# Patient Record
Sex: FEMALE | Race: WHITE
[De-identification: names, ages, dates, MRNs, and addresses within clinical notes are randomized per-mention and may not be internally consistent; named-entity substitution may affect disease eponyms.]

---

## 2019-10-21 ENCOUNTER — RX ONLY (RX ONLY)
Age: 39
End: 2019-10-21

## 2019-10-21 ENCOUNTER — OPTICAL OFFICE (OUTPATIENT)
Dept: URBAN - NONMETROPOLITAN AREA CLINIC 5 | Facility: CLINIC | Age: 39
Setting detail: OPHTHALMOLOGY
End: 2019-10-21
Payer: COMMERCIAL

## 2019-10-21 ENCOUNTER — DOCTOR'S OFFICE (OUTPATIENT)
Dept: URBAN - NONMETROPOLITAN AREA CLINIC 2 | Facility: CLINIC | Age: 39
Setting detail: OPHTHALMOLOGY
End: 2019-10-21
Payer: COMMERCIAL

## 2019-10-21 DIAGNOSIS — H52.223: ICD-10-CM

## 2019-10-21 DIAGNOSIS — H52.13: ICD-10-CM

## 2019-10-21 PROCEDURE — V2750 ANTI-REFLECTIVE COATING: HCPCS | Performed by: OPTOMETRIST

## 2019-10-21 PROCEDURE — V2020 VISION SVCS FRAMES PURCHASES: HCPCS | Performed by: OPTOMETRIST

## 2019-10-21 PROCEDURE — 92004 COMPRE OPH EXAM NEW PT 1/>: CPT | Performed by: OPTOMETRIST

## 2019-10-21 PROCEDURE — V2104 SPHEROCYLINDR 4.00D/2.12-4D: HCPCS | Performed by: OPTOMETRIST

## 2019-10-21 ASSESSMENT — REFRACTION_MANIFEST
OS_AXIS: 165
OD_VA1: 20/
OD_VA3: 20/
OS_VA1: 20/20
OS_VA2: 20/
OD_VA2: 20/
OS_SPHERE: -1.75
OD_VA2: 20/
OS_VA1: 20/
OD_VA3: 20/
OU_VA: 20/
OS_VA2: 20/
OS_CYLINDER: -3.25
OS_VA3: 20/
OD_AXIS: 010
OD_VA1: 20/20
OD_CYLINDER: -3.00
OU_VA: 20/20
OS_VA3: 20/
OD_SPHERE: -1.50

## 2019-10-21 ASSESSMENT — AXIALLENGTH_DERIVED
OD_AL: 23.1176
OD_AL: 23.0244

## 2019-10-21 ASSESSMENT — KERATOMETRY
OD_AXISANGLE_DEGREES: 169
OD_K2POWER_DIOPTERS: 49.75
OD_K1POWER_DIOPTERS: 46.25

## 2019-10-21 ASSESSMENT — REFRACTION_CURRENTRX
OS_OVR_VA: 20/
OD_AXIS: 009
OD_VPRISM_DIRECTION: SV
OS_CYLINDER: -3.00
OD_OVR_VA: 20/
OD_OVR_VA: 20/
OS_SPHERE: -1.75
OD_OVR_VA: 20/
OD_CYLINDER: -2.75
OS_VPRISM_DIRECTION: SV
OS_OVR_VA: 20/
OS_AXIS: 163
OD_SPHERE: -1.75
OS_OVR_VA: 20/

## 2019-10-21 ASSESSMENT — CONFRONTATIONAL VISUAL FIELD TEST (CVF)
OS_FINDINGS: FULL
OD_FINDINGS: FULL

## 2019-10-21 ASSESSMENT — REFRACTION_AUTOREFRACTION
OS_SPHERE: -1.25
OD_CYLINDER: -3.50
OS_AXIS: 161
OD_SPHERE: -1.00
OS_CYLINDER: -3.75
OD_AXIS: 005

## 2019-10-21 ASSESSMENT — SPHEQUIV_DERIVED
OD_SPHEQUIV: -2.75
OS_SPHEQUIV: -3.375
OS_SPHEQUIV: -3.125
OD_SPHEQUIV: -3

## 2019-10-21 ASSESSMENT — VISUAL ACUITY
OD_BCVA: 20/20
OS_BCVA: 20/20-1

## 2021-05-17 ENCOUNTER — APPOINTMENT (OUTPATIENT)
Dept: PREADMISSION TESTING | Facility: HOSPITAL | Age: 41
End: 2021-05-17
Attending: ORTHOPAEDIC SURGERY
Payer: OTHER MISCELLANEOUS

## 2021-05-17 ENCOUNTER — HOSPITAL ENCOUNTER (OUTPATIENT)
Dept: CARDIOLOGY | Facility: HOSPITAL | Age: 41
Discharge: HOME | End: 2021-05-17
Attending: ORTHOPAEDIC SURGERY
Payer: OTHER MISCELLANEOUS

## 2021-05-17 ENCOUNTER — TRANSCRIBE ORDERS (OUTPATIENT)
Dept: PREADMISSION TESTING | Facility: HOSPITAL | Age: 41
End: 2021-05-17

## 2021-05-17 VITALS
OXYGEN SATURATION: 97 % | DIASTOLIC BLOOD PRESSURE: 58 MMHG | HEIGHT: 67 IN | HEART RATE: 72 BPM | WEIGHT: 145 LBS | RESPIRATION RATE: 20 BRPM | BODY MASS INDEX: 22.76 KG/M2 | TEMPERATURE: 97.9 F | SYSTOLIC BLOOD PRESSURE: 90 MMHG

## 2021-05-17 DIAGNOSIS — Z01.818 ENCOUNTER FOR OTHER PREPROCEDURAL EXAMINATION: ICD-10-CM

## 2021-05-17 DIAGNOSIS — Q76.2 CONGENITAL SPONDYLOLISTHESIS: ICD-10-CM

## 2021-05-17 DIAGNOSIS — Z11.59 ENCOUNTER FOR SCREENING FOR OTHER VIRAL DISEASES: Primary | ICD-10-CM

## 2021-05-17 DIAGNOSIS — Z11.59 ENCOUNTER FOR SCREENING FOR OTHER VIRAL DISEASES: ICD-10-CM

## 2021-05-17 DIAGNOSIS — Z01.818 ENCOUNTER FOR OTHER PREPROCEDURAL EXAMINATION: Primary | ICD-10-CM

## 2021-05-17 PROBLEM — F41.9 ANXIETY: Status: ACTIVE | Noted: 2021-05-17

## 2021-05-17 PROBLEM — I95.9 LOW BLOOD PRESSURE: Status: ACTIVE | Noted: 2021-05-17

## 2021-05-17 LAB
ABO + RH BLD: NORMAL
ANION GAP SERPL CALC-SCNC: 7 MEQ/L (ref 3–15)
ATRIAL RATE: 66
BLD GP AB SCN SERPL QL: NEGATIVE
BLOOD BANK CMNT PATIENT-IMP: NORMAL
BUN SERPL-MCNC: 13 MG/DL (ref 8–20)
CALCIUM SERPL-MCNC: 9.4 MG/DL (ref 8.9–10.3)
CHLORIDE SERPL-SCNC: 104 MEQ/L (ref 98–109)
CO2 SERPL-SCNC: 26 MEQ/L (ref 22–32)
CREAT SERPL-MCNC: 0.6 MG/DL (ref 0.6–1.1)
D AG BLD QL: POSITIVE
ERYTHROCYTE [DISTWIDTH] IN BLOOD BY AUTOMATED COUNT: 12.2 % (ref 11.7–14.4)
GFR SERPL CREATININE-BSD FRML MDRD: >60 ML/MIN/1.73M*2
GLUCOSE SERPL-MCNC: 89 MG/DL (ref 70–99)
HCT VFR BLDCO AUTO: 42.8 % (ref 35–45)
HGB BLD-MCNC: 14.1 G/DL (ref 11.8–15.7)
LABORATORY COMMENT REPORT: NORMAL
MCH RBC QN AUTO: 31.9 PG (ref 28–33.2)
MCHC RBC AUTO-ENTMCNC: 32.9 G/DL (ref 32.2–35.5)
MCV RBC AUTO: 96.8 FL (ref 83–98)
P AXIS: 55
PDW BLD AUTO: 9.9 FL (ref 9.4–12.3)
PLATELET # BLD AUTO: 200 K/UL (ref 150–369)
POTASSIUM SERPL-SCNC: 4.4 MEQ/L (ref 3.6–5.1)
PR INTERVAL: 136
QRS DURATION: 88
QT INTERVAL: 388
QTC CALCULATION(BAZETT): 406
R AXIS: 76
RBC # BLD AUTO: 4.42 M/UL (ref 3.93–5.22)
SARS-COV-2 RNA RESP QL NAA+PROBE: NEGATIVE
SODIUM SERPL-SCNC: 137 MEQ/L (ref 136–144)
SPECIMEN EXP DATE BLD: NORMAL
T WAVE AXIS: 55
VENTRICULAR RATE: 66
WBC # BLD AUTO: 7.35 K/UL (ref 3.8–10.5)

## 2021-05-17 PROCEDURE — 85027 COMPLETE CBC AUTOMATED: CPT

## 2021-05-17 PROCEDURE — 80048 BASIC METABOLIC PNL TOTAL CA: CPT

## 2021-05-17 PROCEDURE — U0003 INFECTIOUS AGENT DETECTION BY NUCLEIC ACID (DNA OR RNA); SEVERE ACUTE RESPIRATORY SYNDROME CORONAVIRUS 2 (SARS-COV-2) (CORONAVIRUS DISEASE [COVID-19]), AMPLIFIED PROBE TECHNIQUE, MAKING USE OF HIGH THROUGHPUT TECHNOLOGIES AS DESCRIBED BY CMS-2020-01-R: HCPCS

## 2021-05-17 PROCEDURE — 86850 RBC ANTIBODY SCREEN: CPT

## 2021-05-17 PROCEDURE — 93005 ELECTROCARDIOGRAM TRACING: CPT

## 2021-05-17 PROCEDURE — 36415 COLL VENOUS BLD VENIPUNCTURE: CPT

## 2021-05-17 PROCEDURE — 99203 OFFICE O/P NEW LOW 30 MIN: CPT | Performed by: HOSPITALIST

## 2021-05-17 RX ORDER — CLONAZEPAM 0.5 MG/1
0.5 TABLET ORAL
COMMUNITY
Start: 2021-03-18 | End: 2021-05-17 | Stop reason: ENTERED-IN-ERROR

## 2021-05-17 RX ORDER — CLONAZEPAM 0.5 MG/1
TABLET ORAL AS NEEDED
COMMUNITY
Start: 2021-03-18

## 2021-05-17 RX ORDER — SERTRALINE HYDROCHLORIDE 50 MG/1
50 TABLET, FILM COATED ORAL
COMMUNITY
Start: 2021-04-20

## 2021-05-17 RX ORDER — NAPROXEN 500 MG/1
500 TABLET ORAL
COMMUNITY
End: 2021-05-21 | Stop reason: HOSPADM

## 2021-05-17 ASSESSMENT — PAIN SCALES - GENERAL: PAINLEVEL: 7

## 2021-05-17 NOTE — ASSESSMENT & PLAN NOTE
Good exercise capacity able to reach greater than 4 mets activity levels.  No history of CAD, no CHF, no arrhythmias, no TIA/CVA.  ECG benign.  Preop labs within acceptable limits.    P:  Can proceed to surgery without additional testing at an acceptable risk for this intermediate risk surgery  NSAID use preop per orthopedic recommendations

## 2021-05-17 NOTE — PRE-PROCEDURE INSTRUCTIONS
1. Admissions will call you with your arrival time on  5/19 (day prior to surgery) between 2pm - 4pm. For questions about your arrival time, please call [phone].    2. Please report to the [facility] in the [place] on the day of your procedure. Enter the hospital through the [place] [facility] (main entrance at [address], [place]). If you are parking in the [facility], come to the ground floor of the garage and follow signs to the [facility]. Bring your insurance card and photo ID.     3. Please follow these fasting guidelines:  - STOP all solid food 8 hours prior to arrival.   - No more than 12oz of water is permitted and must STOP 2 HOURS prior to arrival to the hospital.     4. Early on the morning of the procedure, please take the following medications listed below with a sip of water, in addition to any medications prescribed by your surgeon:   Sertraline (Zoloft)    *NO aspirin, ibuprofen, anti-inflammatories, fish oil or Vitamin E unless ordered by physician.    *Stop taking Naproxen-stopped in March 2021     5. Other instructions: antibacterial shower x 2 days prior to surgery 5/18, 5/19 and the morning of surgery 5/20 , brush teeth    6. If you develop a cough, cold, fever, or other symptom prior to the date of the procedure, please report it to your physician immediately.    7. If you need to cancel the procedure for any reason, please contact your physician.    8. Make arrangements to have someone drive you home from the procedure. If you have not arranged for transportation home, your surgery may be cancelled.     9. You may not take public transportation or a cab unless accompanied by a responsible person.    10. You may not drive a car or operate complex or potentially dangerous machinery for 24 hours following anesthesia and/or sedation.    11. If it is medically necessary for you to have a longer stay, you will be informed as soon as the decision is  made.    12. Do not wear or bring anything of value to the hospital, including medication or jewelry of any kind. Do not wear make-up or contact lenses. Do bring your glasses and hearing aid, with a case. If you use a CPAP machine and will be overnight, please bring it with you. If you use any inhalers, please bring them as well.     13. Dress in comfortable clothes.    14. If instructed, please bring a copy of your Advance Directive (Living Will/Durable Power of ) on the day of your procedure.    15. Ensuring your safety at all times is a very important part of our [facility] Culture of Safety. After having surgery and sedation, you are at risk for falling and balance issues. Although you may feel awake, the effects of the medication can last up to 24 hours after anesthesia. If you need to use the bathroom during your recovery period, nursing staff will escort you there and stay with you to ensure your safety.    Pre operative instructions given as per protocol.  Form explained by:     Lauryn Zambrano RN

## 2021-05-17 NOTE — CONSULTS
[facility] Medicine Service -  Pre-Operative Consultation       Patient Name: Sharifa Sal  Referring Surgeon: Dr. Skip Golden    Reason for Referral: Pre-Operative Evaluation  Surgical Procedure: Posterior L5-S1 Decompression, Instrumented Fusion, Autograft, Allograft  Operative Date: 2021  Other Providers:      PCP: Unable, To Obtain Pcp          HISTORY OF PRESENT ILLNESS      Sharifa Sal is a 40 y.o. female presenting today to the [facility] at [facility] for pre-operative evaluation prior to planned surgery.    Reports having significant low back pain for about a year since a work injury.  No relief with conservative measures such as physical therapy, pain control/back injections.  Her back pain interfering with her mobility, day-to-day activities so opting for surgery    In regards to medical history:      The patient denies any current or recent chest pain or pressure, dyspnea, cough, sputum, fevers, chills, abdominal pain, nausea, vomiting, diarrhea or other symptoms. No urinary symptoms, no BRBPR or melena    Functionally, the patient is able to ascend a flight or so of stairs with no dyspnea or chest pain.     The patient denies, on specific questioning, the following:  No history of MI, arrhythmia,or CHF.  No history of ROXANA.  No history of DVT/PE.  No history of COPD.  No history of CVA.  No history of DM.   No history of CKD.     PAST MEDICAL AND SURGICAL HISTORY      Past Medical History:   Diagnosis Date   • Anxiety    • Gluten intolerance    • H/O migraine     x3 years without a migraine   • Numbness and tingling     RLE    • Spondylolisthesis        Past Surgical History:   Procedure Laterality Date   • ARTHROSCOPIC REPAIR ACL Right    •  SECTION     • HAND TENDON SURGERY Right    • ROTATOR CUFF REPAIR Right        MEDICATIONS        Current Outpatient Medications:   •  clonazePAM (klonoPIN) 0.5 mg tablet, as needed.  , Disp: ,  "Rfl:   •  mv-min/iron/folic/calcium/vitK (WOMEN'S MULTIVITAMIN ORAL), Take by mouth., Disp: , Rfl:   •  naproxen (NAPROSYN) 500 mg tablet, Take 500 mg by mouth., Disp: , Rfl:   •  sertraline (ZOLOFT) 50 mg tablet, Take 50 mg by mouth once daily., Disp: , Rfl:     ALLERGIES      Oxycodone-acetaminophen and Morphine    FAMILY HISTORY      No pertinent family history    Denies any prior known family history of DVTs/PEs/clotting disorder    SOCIAL HISTORY      Social History     Tobacco Use   • Smoking status: Current Every Day Smoker     Packs/day: 1.00     Years: 15.00     Pack years: 15.00     Types: Cigarettes   • Smokeless tobacco: Never Used   Vaping Use   • Vaping Use: Never used   Substance Use Topics   • Alcohol use: Yes     Comment: couple times a month-social   • Drug use: Never       REVIEW OF SYSTEMS      All other systems reviewed and negative except as noted in HPI    PHYSICAL EXAMINATION      Visit Vitals  BP (!) 90/58 (BP Location: Right upper arm, Patient Position: Sitting)   Pulse 72   Temp 36.6 °C (97.9 °F) (Temporal)   Resp 20   Ht 1.702 m (5' 7\")   Wt 65.8 kg (145 lb)   LMP 05/15/2021   SpO2 97%   BMI 22.71 kg/m²     Body mass index is 22.71 kg/m².    Physical Exam  Constitutional:       General: She is not in acute distress.     Appearance: Normal appearance.   HENT:      Head: Normocephalic and atraumatic.   Eyes:      Conjunctiva/sclera: Conjunctivae normal.   Cardiovascular:      Rate and Rhythm: Normal rate and regular rhythm.      Heart sounds: Normal heart sounds.   Pulmonary:      Effort: Pulmonary effort is normal.      Breath sounds: Normal breath sounds.   Abdominal:      General: Bowel sounds are normal. There is no distension.      Palpations: Abdomen is soft.      Tenderness: There is no abdominal tenderness.   Musculoskeletal:      Cervical back: Neck supple.      Comments: Lower back limited ROM from pain   Skin:     General: Skin is warm and dry.   Neurological:      Mental Status: " She is alert and oriented to person, place, and time.   Psychiatric:         Mood and Affect: Mood normal.         Behavior: Behavior normal.         LABS / EKG        Labs  reviewed    Lab Results   Component Value Date     05/17/2021    K 4.4 05/17/2021     05/17/2021    BUN 13 05/17/2021    CREATININE 0.6 05/17/2021    WBC 7.35 05/17/2021    HGB 14.1 05/17/2021    HCT 42.8 05/17/2021     05/17/2021         ECG/Telemetry  ECG personally reviewed: Normal sinus rhythm with sinus arrhythmia.    ASSESSMENT AND PLAN         Encounter for other preprocedural examination  Good exercise capacity able to reach greater than 4 mets activity levels.  No history of CAD, no CHF, no arrhythmias, no TIA/CVA.  ECG benign.  Preop labs within acceptable limits.    P:  Can proceed to surgery without additional testing at an acceptable risk for this intermediate risk surgery  NSAID use preop per orthopedic recommendations    Low blood pressure  Blood pressure in low normal range.  Patient is asymptomatic and this is her baseline.  Encouraged hydration.  Monitor guillermo-/postop.    Anxiety  Continue Zoloft and Klonopin as needed.  Cautious use of Klonopin postop with narcotics to avoid oversedation.       In regards to perioperative cardiac risk:  The patient denies any history of ischemic heart disease, denies any history of CHF, denies any history of CVA, is not on pre-operative treatment with insulin, and does not have a pre-operative creatinine > 2 mg/dL.   The Revised Cardiac Risk Index (RCRI) for this patient indicates 0.4% risk.     Further comments:  Resume supplements when OK with surgical team.  I would encourage incentive spirometry to assist with minimizing guillermo-operative pulmonary risk.  DVT prophylaxis and timing of such per the discretion of the surgeon.     Please do not hesitate to contact [facility] during the upcoming hospitalization with any questions or concerns.     Nam Fernandez MD  5/18/2021

## 2021-05-17 NOTE — ASSESSMENT & PLAN NOTE
Blood pressure in low normal range.  Patient is asymptomatic and this is her baseline.  Encouraged hydration.  Monitor guillermo-/postop.

## 2021-05-17 NOTE — H&P (VIEW-ONLY)
[facility] Medicine Service -  Pre-Operative Consultation       Patient Name: Sharifa Sal  Referring Surgeon: Dr. Skip Golden    Reason for Referral: Pre-Operative Evaluation  Surgical Procedure: Posterior L5-S1 Decompression, Instrumented Fusion, Autograft, Allograft  Operative Date: 2021  Other Providers:      PCP: Unable, To Obtain Pcp          HISTORY OF PRESENT ILLNESS      Sharifa Sal is a 40 y.o. female presenting today to the [facility] at [facility] for pre-operative evaluation prior to planned surgery.    Reports having significant low back pain for about a year since a work injury.  No relief with conservative measures such as physical therapy, pain control/back injections.  Her back pain interfering with her mobility, day-to-day activities so opting for surgery    In regards to medical history:      The patient denies any current or recent chest pain or pressure, dyspnea, cough, sputum, fevers, chills, abdominal pain, nausea, vomiting, diarrhea or other symptoms. No urinary symptoms, no BRBPR or melena    Functionally, the patient is able to ascend a flight or so of stairs with no dyspnea or chest pain.     The patient denies, on specific questioning, the following:  No history of MI, arrhythmia,or CHF.  No history of ROXANA.  No history of DVT/PE.  No history of COPD.  No history of CVA.  No history of DM.   No history of CKD.     PAST MEDICAL AND SURGICAL HISTORY      Past Medical History:   Diagnosis Date   • Anxiety    • Gluten intolerance    • H/O migraine     x3 years without a migraine   • Numbness and tingling     RLE    • Spondylolisthesis        Past Surgical History:   Procedure Laterality Date   • ARTHROSCOPIC REPAIR ACL Right    •  SECTION     • HAND TENDON SURGERY Right    • ROTATOR CUFF REPAIR Right        MEDICATIONS        Current Outpatient Medications:   •  clonazePAM (klonoPIN) 0.5 mg tablet, as needed.  , Disp: ,  "Rfl:   •  mv-min/iron/folic/calcium/vitK (WOMEN'S MULTIVITAMIN ORAL), Take by mouth., Disp: , Rfl:   •  naproxen (NAPROSYN) 500 mg tablet, Take 500 mg by mouth., Disp: , Rfl:   •  sertraline (ZOLOFT) 50 mg tablet, Take 50 mg by mouth once daily., Disp: , Rfl:     ALLERGIES      Oxycodone-acetaminophen and Morphine    FAMILY HISTORY      No pertinent family history    Denies any prior known family history of DVTs/PEs/clotting disorder    SOCIAL HISTORY      Social History     Tobacco Use   • Smoking status: Current Every Day Smoker     Packs/day: 1.00     Years: 15.00     Pack years: 15.00     Types: Cigarettes   • Smokeless tobacco: Never Used   Vaping Use   • Vaping Use: Never used   Substance Use Topics   • Alcohol use: Yes     Comment: couple times a month-social   • Drug use: Never       REVIEW OF SYSTEMS      All other systems reviewed and negative except as noted in HPI    PHYSICAL EXAMINATION      Visit Vitals  BP (!) 90/58 (BP Location: Right upper arm, Patient Position: Sitting)   Pulse 72   Temp 36.6 °C (97.9 °F) (Temporal)   Resp 20   Ht 1.702 m (5' 7\")   Wt 65.8 kg (145 lb)   LMP 05/15/2021   SpO2 97%   BMI 22.71 kg/m²     Body mass index is 22.71 kg/m².    Physical Exam  Constitutional:       General: She is not in acute distress.     Appearance: Normal appearance.   HENT:      Head: Normocephalic and atraumatic.   Eyes:      Conjunctiva/sclera: Conjunctivae normal.   Cardiovascular:      Rate and Rhythm: Normal rate and regular rhythm.      Heart sounds: Normal heart sounds.   Pulmonary:      Effort: Pulmonary effort is normal.      Breath sounds: Normal breath sounds.   Abdominal:      General: Bowel sounds are normal. There is no distension.      Palpations: Abdomen is soft.      Tenderness: There is no abdominal tenderness.   Musculoskeletal:      Cervical back: Neck supple.      Comments: Lower back limited ROM from pain   Skin:     General: Skin is warm and dry.   Neurological:      Mental Status: " She is alert and oriented to person, place, and time.   Psychiatric:         Mood and Affect: Mood normal.         Behavior: Behavior normal.         LABS / EKG        Labs  reviewed    Lab Results   Component Value Date     05/17/2021    K 4.4 05/17/2021     05/17/2021    BUN 13 05/17/2021    CREATININE 0.6 05/17/2021    WBC 7.35 05/17/2021    HGB 14.1 05/17/2021    HCT 42.8 05/17/2021     05/17/2021         ECG/Telemetry  ECG personally reviewed: Normal sinus rhythm with sinus arrhythmia.    ASSESSMENT AND PLAN         Encounter for other preprocedural examination  Good exercise capacity able to reach greater than 4 mets activity levels.  No history of CAD, no CHF, no arrhythmias, no TIA/CVA.  ECG benign.  Preop labs within acceptable limits.    P:  Can proceed to surgery without additional testing at an acceptable risk for this intermediate risk surgery  NSAID use preop per orthopedic recommendations    Low blood pressure  Blood pressure in low normal range.  Patient is asymptomatic and this is her baseline.  Encouraged hydration.  Monitor guillermo-/postop.    Anxiety  Continue Zoloft and Klonopin as needed.  Cautious use of Klonopin postop with narcotics to avoid oversedation.       In regards to perioperative cardiac risk:  The patient denies any history of ischemic heart disease, denies any history of CHF, denies any history of CVA, is not on pre-operative treatment with insulin, and does not have a pre-operative creatinine > 2 mg/dL.   The Revised Cardiac Risk Index (RCRI) for this patient indicates 0.4% risk.     Further comments:  Resume supplements when OK with surgical team.  I would encourage incentive spirometry to assist with minimizing guillermo-operative pulmonary risk.  DVT prophylaxis and timing of such per the discretion of the surgeon.     Please do not hesitate to contact [facility] during the upcoming hospitalization with any questions or concerns.     Nam Fernandez MD  5/18/2021

## 2021-05-17 NOTE — ASSESSMENT & PLAN NOTE
Continue Zoloft and Klonopin as needed.  Cautious use of Klonopin postop with narcotics to avoid oversedation.

## 2021-05-18 ENCOUNTER — ANESTHESIA EVENT (OUTPATIENT)
Dept: OPERATING ROOM | Facility: HOSPITAL | Age: 41
Setting detail: HOSPITAL OUTPATIENT SURGERY
End: 2021-05-18
Payer: OTHER MISCELLANEOUS

## 2021-05-19 NOTE — ANESTHESIOLOGIST PRE-PROCEDURE CHART REVIEW
I confirm that I have reviewed the available information in the medical record prior to the scheduled surgery. Needs HCG preop

## 2021-05-19 NOTE — ANESTHESIA PREPROCEDURE EVALUATION
Relevant Problems   NEUROLOGY   (+) Anxiety     40F with spondylolisthesis for L5-S1 decompression.    Anesthesia ROS/MED HX    Anesthesia History    Previous anesthetics  No family history of anesthetic complications  No history of anesthetic complications  Pulmonary - neg  Neuro/Psych    Anxiety  Cardiovascular- neg   Covid19 Test Reviewed and ECG reviewed  Hematological - neg  GI/Hepatic- neg  Musculoskeletal- neg  Renal Disease- neg  Endo/Other- neg  Body Habitus: Normal       Past Surgical History:   Procedure Laterality Date   • ARTHROSCOPIC REPAIR ACL Right    •  SECTION     • HAND TENDON SURGERY Right    • ROTATOR CUFF REPAIR Right      Normal sinus rhythm with sinus arrhythmia   Normal ECG   Confirmed by NARAYAN YING MD ([id number]) on 2021 4:03:32 PM    Physical Exam    Airway   Mallampati: II   TM distance: >3 FB   Neck ROM: full  Cardiovascular    Rhythm: regular   Rate: normalPulmonary    clear to auscultation  Dental - normal        Anesthesia Plan    Plan: general    Technique: general endotracheal     Airway: direct visual laryngoscopy   ASA 2  Blood Products:   Use of Blood Products Discussed: No   Anesthetic plan and risks discussed with: patient  Induction:    intravenous   Postop Plan:   Patient Disposition: phase II then home   Pain Management: IV analgesics

## 2021-05-20 ENCOUNTER — ANESTHESIA (OUTPATIENT)
Dept: OPERATING ROOM | Facility: HOSPITAL | Age: 41
Setting detail: HOSPITAL OUTPATIENT SURGERY
End: 2021-05-20
Payer: OTHER MISCELLANEOUS

## 2021-05-20 ENCOUNTER — APPOINTMENT (OUTPATIENT)
Dept: RADIOLOGY | Facility: HOSPITAL | Age: 41
Setting detail: HOSPITAL OUTPATIENT SURGERY
End: 2021-05-20
Attending: ORTHOPAEDIC SURGERY
Payer: OTHER MISCELLANEOUS

## 2021-05-20 ENCOUNTER — HOSPITAL ENCOUNTER (OUTPATIENT)
Facility: HOSPITAL | Age: 41
LOS: 1 days | Discharge: HOME | End: 2021-05-21
Attending: ORTHOPAEDIC SURGERY | Admitting: ORTHOPAEDIC SURGERY
Payer: OTHER MISCELLANEOUS

## 2021-05-20 PROBLEM — Z98.1 S/P LUMBAR SPINAL FUSION: Status: ACTIVE | Noted: 2021-05-17

## 2021-05-20 LAB
ABO + RH BLD: NORMAL
B-HCG UR QL: NEGATIVE
D AG BLD QL: POSITIVE
LABORATORY COMMENT REPORT: NORMAL
POCT TEST: NORMAL

## 2021-05-20 PROCEDURE — 25000000 HC PHARMACY GENERAL: Performed by: ORTHOPAEDIC SURGERY

## 2021-05-20 PROCEDURE — 36415 COLL VENOUS BLD VENIPUNCTURE: CPT | Performed by: ORTHOPAEDIC SURGERY

## 2021-05-20 PROCEDURE — C1776 JOINT DEVICE (IMPLANTABLE): HCPCS | Performed by: ORTHOPAEDIC SURGERY

## 2021-05-20 PROCEDURE — 0SG00AJ FUSION OF LUMBAR VERTEBRAL JOINT WITH INTERBODY FUSION DEVICE, POSTERIOR APPROACH, ANTERIOR COLUMN, OPEN APPROACH: ICD-10-PCS | Performed by: ORTHOPAEDIC SURGERY

## 2021-05-20 PROCEDURE — 63600000 HC DRUGS/DETAIL CODE: Performed by: STUDENT IN AN ORGANIZED HEALTH CARE EDUCATION/TRAINING PROGRAM

## 2021-05-20 PROCEDURE — 63700000 HC SELF-ADMINISTRABLE DRUG

## 2021-05-20 PROCEDURE — 71000011 HC PACU PHASE 1 EA ADDL MIN: Performed by: ORTHOPAEDIC SURGERY

## 2021-05-20 PROCEDURE — 99225 PR SBSQ OBSERVATION CARE/DAY 25 MINUTES: CPT | Performed by: HOSPITALIST

## 2021-05-20 PROCEDURE — 25800000 HC PHARMACY IV SOLUTIONS: Performed by: STUDENT IN AN ORGANIZED HEALTH CARE EDUCATION/TRAINING PROGRAM

## 2021-05-20 PROCEDURE — 37000001 HC ANESTHESIA GENERAL: Performed by: ORTHOPAEDIC SURGERY

## 2021-05-20 PROCEDURE — 27200000 HC STERILE SUPPLY: Performed by: ORTHOPAEDIC SURGERY

## 2021-05-20 PROCEDURE — 36000004 HC OR LEVEL 4 INITIAL 30MIN: Performed by: ORTHOPAEDIC SURGERY

## 2021-05-20 PROCEDURE — 63700000 HC SELF-ADMINISTRABLE DRUG: Performed by: NURSE PRACTITIONER

## 2021-05-20 PROCEDURE — 72100 X-RAY EXAM L-S SPINE 2/3 VWS: CPT

## 2021-05-20 PROCEDURE — C1713 ANCHOR/SCREW BN/BN,TIS/BN: HCPCS | Performed by: ORTHOPAEDIC SURGERY

## 2021-05-20 PROCEDURE — 36000014 HC OR LEVEL 4 EA ADDL MIN: Performed by: ORTHOPAEDIC SURGERY

## 2021-05-20 PROCEDURE — C1762 CONN TISS, HUMAN(INC FASCIA): HCPCS | Performed by: ORTHOPAEDIC SURGERY

## 2021-05-20 PROCEDURE — 97166 OT EVAL MOD COMPLEX 45 MIN: CPT | Mod: GO

## 2021-05-20 PROCEDURE — 97116 GAIT TRAINING THERAPY: CPT | Mod: GP | Performed by: PHYSICAL THERAPIST

## 2021-05-20 PROCEDURE — 71000001 HC PACU PHASE 1 INITIAL 30MIN: Performed by: ORTHOPAEDIC SURGERY

## 2021-05-20 PROCEDURE — 25800000 HC PHARMACY IV SOLUTIONS: Performed by: NURSE PRACTITIONER

## 2021-05-20 PROCEDURE — 25000000 HC PHARMACY GENERAL: Performed by: STUDENT IN AN ORGANIZED HEALTH CARE EDUCATION/TRAINING PROGRAM

## 2021-05-20 PROCEDURE — 97161 PT EVAL LOW COMPLEX 20 MIN: CPT | Mod: GP | Performed by: PHYSICAL THERAPIST

## 2021-05-20 PROCEDURE — 63600000 HC DRUGS/DETAIL CODE: Performed by: ORTHOPAEDIC SURGERY

## 2021-05-20 PROCEDURE — 63600000 HC DRUGS/DETAIL CODE: Performed by: NURSE PRACTITIONER

## 2021-05-20 DEVICE — IMPLANTABLE DEVICE: Type: IMPLANTABLE DEVICE | Site: SPINE LUMBAR | Status: FUNCTIONAL

## 2021-05-20 DEVICE — FIBERS DBM 10CC CORT INTERGRO: Type: IMPLANTABLE DEVICE | Site: SPINE LUMBAR | Status: FUNCTIONAL

## 2021-05-20 DEVICE — CAPS LOCKING: Type: IMPLANTABLE DEVICE | Site: SPINE LUMBAR | Status: FUNCTIONAL

## 2021-05-20 DEVICE — SCREW PEDICLE 6.5X40MM: Type: IMPLANTABLE DEVICE | Site: SPINE LUMBAR | Status: FUNCTIONAL

## 2021-05-20 DEVICE — SCREW PEDICLE 6.5 X 45MM: Type: IMPLANTABLE DEVICE | Site: SPINE LUMBAR | Status: FUNCTIONAL

## 2021-05-20 DEVICE — CAGE EXPANDABLE RISE 10X22MM               7-13: Type: IMPLANTABLE DEVICE | Site: SPINE LUMBAR | Status: FUNCTIONAL

## 2021-05-20 RX ORDER — DEXTROSE 40 %
15-30 GEL (GRAM) ORAL AS NEEDED
Status: DISCONTINUED | OUTPATIENT
Start: 2021-05-20 | End: 2021-05-21 | Stop reason: HOSPADM

## 2021-05-20 RX ORDER — IBUPROFEN 200 MG
16-32 TABLET ORAL AS NEEDED
Status: DISCONTINUED | OUTPATIENT
Start: 2021-05-20 | End: 2021-05-20

## 2021-05-20 RX ORDER — SODIUM CHLORIDE 9 MG/ML
INJECTION, SOLUTION INTRAVENOUS CONTINUOUS
Status: ACTIVE | OUTPATIENT
Start: 2021-05-20 | End: 2021-05-21

## 2021-05-20 RX ORDER — ACETAMINOPHEN 325 MG/1
TABLET ORAL
Status: COMPLETED
Start: 2021-05-20 | End: 2021-05-20

## 2021-05-20 RX ORDER — DEXAMETHASONE SODIUM PHOSPHATE 4 MG/ML
INJECTION, SOLUTION INTRA-ARTICULAR; INTRALESIONAL; INTRAMUSCULAR; INTRAVENOUS; SOFT TISSUE AS NEEDED
Status: DISCONTINUED | OUTPATIENT
Start: 2021-05-20 | End: 2021-05-20 | Stop reason: SURG

## 2021-05-20 RX ORDER — GLYCOPYRROLATE 0.6MG/3ML
SYRINGE (ML) INTRAVENOUS AS NEEDED
Status: DISCONTINUED | OUTPATIENT
Start: 2021-05-20 | End: 2021-05-20 | Stop reason: SURG

## 2021-05-20 RX ORDER — ONDANSETRON HYDROCHLORIDE 2 MG/ML
4 INJECTION, SOLUTION INTRAVENOUS
Status: DISCONTINUED | OUTPATIENT
Start: 2021-05-20 | End: 2021-05-20

## 2021-05-20 RX ORDER — DIPHENHYDRAMINE HCL 25 MG
25 CAPSULE ORAL EVERY 6 HOURS PRN
Status: DISCONTINUED | OUTPATIENT
Start: 2021-05-20 | End: 2021-05-21 | Stop reason: HOSPADM

## 2021-05-20 RX ORDER — AMOXICILLIN 250 MG
1 CAPSULE ORAL 2 TIMES DAILY
Status: DISCONTINUED | OUTPATIENT
Start: 2021-05-20 | End: 2021-05-21 | Stop reason: HOSPADM

## 2021-05-20 RX ORDER — PHENYLEPHRINE HYDROCHLORIDE 10 MG/ML
INJECTION INTRAVENOUS AS NEEDED
Status: DISCONTINUED | OUTPATIENT
Start: 2021-05-20 | End: 2021-05-20 | Stop reason: SURG

## 2021-05-20 RX ORDER — OXYCODONE HYDROCHLORIDE 5 MG/1
5-10 TABLET ORAL EVERY 4 HOURS PRN
Status: DISCONTINUED | OUTPATIENT
Start: 2021-05-20 | End: 2021-05-21 | Stop reason: HOSPADM

## 2021-05-20 RX ORDER — IBUPROFEN 200 MG
16-32 TABLET ORAL AS NEEDED
Status: DISCONTINUED | OUTPATIENT
Start: 2021-05-20 | End: 2021-05-21 | Stop reason: HOSPADM

## 2021-05-20 RX ORDER — DEXTROSE 50 % IN WATER (D50W) INTRAVENOUS SYRINGE
25 AS NEEDED
Status: DISCONTINUED | OUTPATIENT
Start: 2021-05-20 | End: 2021-05-20

## 2021-05-20 RX ORDER — HYDROMORPHONE HYDROCHLORIDE 1 MG/ML
INJECTION, SOLUTION INTRAMUSCULAR; INTRAVENOUS; SUBCUTANEOUS AS NEEDED
Status: DISCONTINUED | OUTPATIENT
Start: 2021-05-20 | End: 2021-05-20 | Stop reason: SURG

## 2021-05-20 RX ORDER — PROPOFOL 10 MG/ML
INJECTION, EMULSION INTRAVENOUS AS NEEDED
Status: DISCONTINUED | OUTPATIENT
Start: 2021-05-20 | End: 2021-05-20 | Stop reason: SURG

## 2021-05-20 RX ORDER — ONDANSETRON HYDROCHLORIDE 2 MG/ML
4 INJECTION, SOLUTION INTRAVENOUS EVERY 8 HOURS PRN
Status: DISCONTINUED | OUTPATIENT
Start: 2021-05-20 | End: 2021-05-21 | Stop reason: HOSPADM

## 2021-05-20 RX ORDER — DEXTROSE 40 %
15-30 GEL (GRAM) ORAL AS NEEDED
Status: DISCONTINUED | OUTPATIENT
Start: 2021-05-20 | End: 2021-05-20

## 2021-05-20 RX ORDER — BISACODYL 10 MG/1
10 SUPPOSITORY RECTAL DAILY PRN
Status: DISCONTINUED | OUTPATIENT
Start: 2021-05-20 | End: 2021-05-21 | Stop reason: HOSPADM

## 2021-05-20 RX ORDER — ACETAMINOPHEN 325 MG/1
650 TABLET ORAL EVERY 6 HOURS
Status: DISCONTINUED | OUTPATIENT
Start: 2021-05-20 | End: 2021-05-21 | Stop reason: HOSPADM

## 2021-05-20 RX ORDER — DEXAMETHASONE SODIUM PHOSPHATE 4 MG/ML
10 INJECTION, SOLUTION INTRA-ARTICULAR; INTRALESIONAL; INTRAMUSCULAR; INTRAVENOUS; SOFT TISSUE ONCE
Status: DISCONTINUED | OUTPATIENT
Start: 2021-05-21 | End: 2021-05-21

## 2021-05-20 RX ORDER — HYDROMORPHONE HYDROCHLORIDE 1 MG/ML
0.5 INJECTION, SOLUTION INTRAMUSCULAR; INTRAVENOUS; SUBCUTANEOUS
Status: DISCONTINUED | OUTPATIENT
Start: 2021-05-20 | End: 2021-05-20

## 2021-05-20 RX ORDER — CYCLOBENZAPRINE HCL 10 MG
10 TABLET ORAL EVERY 8 HOURS PRN
Status: DISCONTINUED | OUTPATIENT
Start: 2021-05-20 | End: 2021-05-21 | Stop reason: HOSPADM

## 2021-05-20 RX ORDER — CEFAZOLIN SODIUM 2 G/50ML
2 SOLUTION INTRAVENOUS
Status: COMPLETED | OUTPATIENT
Start: 2021-05-20 | End: 2021-05-20

## 2021-05-20 RX ORDER — MIDAZOLAM HYDROCHLORIDE 2 MG/2ML
INJECTION, SOLUTION INTRAMUSCULAR; INTRAVENOUS AS NEEDED
Status: DISCONTINUED | OUTPATIENT
Start: 2021-05-20 | End: 2021-05-20 | Stop reason: SURG

## 2021-05-20 RX ORDER — POLYETHYLENE GLYCOL 3350 17 G/17G
17 POWDER, FOR SOLUTION ORAL DAILY
Status: DISCONTINUED | OUTPATIENT
Start: 2021-05-20 | End: 2021-05-21 | Stop reason: HOSPADM

## 2021-05-20 RX ORDER — BUPIVACAINE HCL/EPINEPHRINE 0.5-1:200K
VIAL (ML) INJECTION AS NEEDED
Status: DISCONTINUED | OUTPATIENT
Start: 2021-05-20 | End: 2021-05-20 | Stop reason: HOSPADM

## 2021-05-20 RX ORDER — CEFAZOLIN SODIUM 2 G/50ML
SOLUTION INTRAVENOUS
Status: COMPLETED
Start: 2021-05-20 | End: 2021-05-20

## 2021-05-20 RX ORDER — ACETAMINOPHEN 325 MG/1
975 TABLET ORAL
Status: COMPLETED | OUTPATIENT
Start: 2021-05-20 | End: 2021-05-20

## 2021-05-20 RX ORDER — HEPARIN SODIUM 5000 [USP'U]/ML
5000 INJECTION, SOLUTION INTRAVENOUS; SUBCUTANEOUS EVERY 8 HOURS
Status: DISCONTINUED | OUTPATIENT
Start: 2021-05-21 | End: 2021-05-21 | Stop reason: HOSPADM

## 2021-05-20 RX ORDER — FENTANYL CITRATE 50 UG/ML
50 INJECTION, SOLUTION INTRAMUSCULAR; INTRAVENOUS
Status: DISCONTINUED | OUTPATIENT
Start: 2021-05-20 | End: 2021-05-20

## 2021-05-20 RX ORDER — SERTRALINE HYDROCHLORIDE 50 MG/1
50 TABLET, FILM COATED ORAL DAILY
Status: DISCONTINUED | OUTPATIENT
Start: 2021-05-20 | End: 2021-05-21 | Stop reason: HOSPADM

## 2021-05-20 RX ORDER — ALUMINUM HYDROXIDE, MAGNESIUM HYDROXIDE, AND SIMETHICONE 1200; 120; 1200 MG/30ML; MG/30ML; MG/30ML
30 SUSPENSION ORAL EVERY 4 HOURS PRN
Status: DISCONTINUED | OUTPATIENT
Start: 2021-05-20 | End: 2021-05-21 | Stop reason: HOSPADM

## 2021-05-20 RX ORDER — CYCLOBENZAPRINE HCL 10 MG
TABLET ORAL
Status: COMPLETED
Start: 2021-05-20 | End: 2021-05-20

## 2021-05-20 RX ORDER — FENTANYL CITRATE 50 UG/ML
INJECTION, SOLUTION INTRAMUSCULAR; INTRAVENOUS AS NEEDED
Status: DISCONTINUED | OUTPATIENT
Start: 2021-05-20 | End: 2021-05-20 | Stop reason: SURG

## 2021-05-20 RX ORDER — PREGABALIN 75 MG/1
CAPSULE ORAL
Status: COMPLETED
Start: 2021-05-20 | End: 2021-05-20

## 2021-05-20 RX ORDER — KETOROLAC TROMETHAMINE 30 MG/ML
INJECTION, SOLUTION INTRAMUSCULAR; INTRAVENOUS AS NEEDED
Status: DISCONTINUED | OUTPATIENT
Start: 2021-05-20 | End: 2021-05-20 | Stop reason: SURG

## 2021-05-20 RX ORDER — ROCURONIUM BROMIDE 10 MG/ML
INJECTION, SOLUTION INTRAVENOUS AS NEEDED
Status: DISCONTINUED | OUTPATIENT
Start: 2021-05-20 | End: 2021-05-20 | Stop reason: SURG

## 2021-05-20 RX ORDER — DEXTROSE 50 % IN WATER (D50W) INTRAVENOUS SYRINGE
25 AS NEEDED
Status: DISCONTINUED | OUTPATIENT
Start: 2021-05-20 | End: 2021-05-21 | Stop reason: HOSPADM

## 2021-05-20 RX ORDER — PREGABALIN 75 MG/1
150 CAPSULE ORAL
Status: COMPLETED | OUTPATIENT
Start: 2021-05-20 | End: 2021-05-20

## 2021-05-20 RX ORDER — EPHEDRINE SULFATE 50 MG/ML
INJECTION, SOLUTION INTRAVENOUS AS NEEDED
Status: DISCONTINUED | OUTPATIENT
Start: 2021-05-20 | End: 2021-05-20 | Stop reason: SURG

## 2021-05-20 RX ORDER — NEOSTIGMINE METHYLSULFATE 1 MG/ML
INJECTION INTRAVENOUS AS NEEDED
Status: DISCONTINUED | OUTPATIENT
Start: 2021-05-20 | End: 2021-05-20 | Stop reason: SURG

## 2021-05-20 RX ORDER — ONDANSETRON HYDROCHLORIDE 2 MG/ML
INJECTION, SOLUTION INTRAVENOUS AS NEEDED
Status: DISCONTINUED | OUTPATIENT
Start: 2021-05-20 | End: 2021-05-20 | Stop reason: SURG

## 2021-05-20 RX ORDER — CYCLOBENZAPRINE HCL 10 MG
10 TABLET ORAL
Status: COMPLETED | OUTPATIENT
Start: 2021-05-20 | End: 2021-05-20

## 2021-05-20 RX ADMIN — ACETAMINOPHEN 975 MG: 325 TABLET ORAL at 07:50

## 2021-05-20 RX ADMIN — PHENYLEPHRINE HYDROCHLORIDE 200 MCG: 10 INJECTION INTRAVENOUS at 10:43

## 2021-05-20 RX ADMIN — SENNOSIDES, DOCUSATE SODIUM 1 TABLET: 8.6; 5 TABLET ORAL at 20:37

## 2021-05-20 RX ADMIN — CYCLOBENZAPRINE HYDROCHLORIDE 10 MG: 10 TABLET, FILM COATED ORAL at 07:50

## 2021-05-20 RX ADMIN — CEFAZOLIN 2 G: 330 INJECTION, POWDER, FOR SOLUTION INTRAMUSCULAR; INTRAVENOUS at 09:51

## 2021-05-20 RX ADMIN — PROPOFOL 20 MG: 10 INJECTION, EMULSION INTRAVENOUS at 10:24

## 2021-05-20 RX ADMIN — ROCURONIUM BROMIDE 10 MG: 10 INJECTION, SOLUTION INTRAVENOUS at 10:35

## 2021-05-20 RX ADMIN — PREGABALIN 150 MG: 75 CAPSULE ORAL at 07:50

## 2021-05-20 RX ADMIN — ROCURONIUM BROMIDE 50 MG: 10 INJECTION, SOLUTION INTRAVENOUS at 09:45

## 2021-05-20 RX ADMIN — ROCURONIUM BROMIDE 20 MG: 10 INJECTION, SOLUTION INTRAVENOUS at 11:20

## 2021-05-20 RX ADMIN — ACETAMINOPHEN 650 MG: 325 TABLET, FILM COATED ORAL at 17:48

## 2021-05-20 RX ADMIN — PROPOFOL 130 MG: 10 INJECTION, EMULSION INTRAVENOUS at 09:44

## 2021-05-20 RX ADMIN — HYDROMORPHONE HYDROCHLORIDE 0.5 MG: 1 INJECTION, SOLUTION INTRAMUSCULAR; INTRAVENOUS; SUBCUTANEOUS at 10:18

## 2021-05-20 RX ADMIN — PROPOFOL 20 MG: 10 INJECTION, EMULSION INTRAVENOUS at 11:48

## 2021-05-20 RX ADMIN — PHENYLEPHRINE HYDROCHLORIDE 200 MCG: 10 INJECTION INTRAVENOUS at 11:04

## 2021-05-20 RX ADMIN — Medication 10 MG: at 07:50

## 2021-05-20 RX ADMIN — PHENYLEPHRINE HYDROCHLORIDE 200 MCG: 10 INJECTION INTRAVENOUS at 10:52

## 2021-05-20 RX ADMIN — DEXAMETHASONE SODIUM PHOSPHATE 8 MG: 4 INJECTION, SOLUTION INTRA-ARTICULAR; INTRALESIONAL; INTRAMUSCULAR; INTRAVENOUS; SOFT TISSUE at 10:03

## 2021-05-20 RX ADMIN — NEOSTIGMINE METHYLSULFATE 4.5 MG: 1 INJECTION INTRAVENOUS at 12:16

## 2021-05-20 RX ADMIN — FENTANYL CITRATE 100 MCG: 50 INJECTION, SOLUTION INTRAMUSCULAR; INTRAVENOUS at 09:43

## 2021-05-20 RX ADMIN — PHENYLEPHRINE HYDROCHLORIDE 100 MCG: 10 INJECTION INTRAVENOUS at 11:38

## 2021-05-20 RX ADMIN — EPHEDRINE SULFATE 10 MG: 50 INJECTION INTRAVENOUS at 11:07

## 2021-05-20 RX ADMIN — ACETAMINOPHEN 975 MG: 325 TABLET, FILM COATED ORAL at 07:50

## 2021-05-20 RX ADMIN — PHENYLEPHRINE HYDROCHLORIDE 200 MCG: 10 INJECTION INTRAVENOUS at 10:01

## 2021-05-20 RX ADMIN — FENTANYL CITRATE 50 MCG: 50 INJECTION, SOLUTION INTRAMUSCULAR; INTRAVENOUS at 12:40

## 2021-05-20 RX ADMIN — PHENYLEPHRINE HYDROCHLORIDE 200 MCG: 10 INJECTION INTRAVENOUS at 09:51

## 2021-05-20 RX ADMIN — PHENYLEPHRINE HYDROCHLORIDE 100 MCG: 10 INJECTION INTRAVENOUS at 11:07

## 2021-05-20 RX ADMIN — SUGAMMADEX 150 MG: 100 INJECTION, SOLUTION INTRAVENOUS at 12:24

## 2021-05-20 RX ADMIN — SODIUM CHLORIDE: 9 INJECTION, SOLUTION INTRAVENOUS at 15:29

## 2021-05-20 RX ADMIN — SODIUM CHLORIDE: 900 INJECTION, SOLUTION INTRAVENOUS at 09:40

## 2021-05-20 RX ADMIN — MIDAZOLAM HYDROCHLORIDE 2 MG: 1 INJECTION, SOLUTION INTRAMUSCULAR; INTRAVENOUS at 09:43

## 2021-05-20 RX ADMIN — Medication 0.4 MG: at 12:16

## 2021-05-20 RX ADMIN — POLYETHYLENE GLYCOL 3350 17 G: 17 POWDER, FOR SOLUTION ORAL at 14:46

## 2021-05-20 RX ADMIN — CYCLOBENZAPRINE HYDROCHLORIDE 10 MG: 10 TABLET, FILM COATED ORAL at 17:04

## 2021-05-20 RX ADMIN — PHENYLEPHRINE HYDROCHLORIDE 200 MCG: 10 INJECTION INTRAVENOUS at 10:32

## 2021-05-20 RX ADMIN — ACETAMINOPHEN 650 MG: 325 TABLET, FILM COATED ORAL at 23:56

## 2021-05-20 RX ADMIN — ONDANSETRON 4 MG: 2 INJECTION INTRAMUSCULAR; INTRAVENOUS at 11:48

## 2021-05-20 RX ADMIN — PHENYLEPHRINE HYDROCHLORIDE 200 MCG: 10 INJECTION INTRAVENOUS at 11:50

## 2021-05-20 RX ADMIN — PHENYLEPHRINE HYDROCHLORIDE 200 MCG: 10 INJECTION INTRAVENOUS at 10:08

## 2021-05-20 RX ADMIN — CEFAZOLIN 2 G: 330 INJECTION, POWDER, FOR SOLUTION INTRAMUSCULAR; INTRAVENOUS at 17:49

## 2021-05-20 RX ADMIN — KETOROLAC TROMETHAMINE 30 MG: 30 INJECTION, SOLUTION INTRAMUSCULAR at 11:48

## 2021-05-20 ASSESSMENT — COGNITIVE AND FUNCTIONAL STATUS - GENERAL
DRESSING REGULAR UPPER BODY CLOTHING: 3 - A LITTLE
AFFECT: WNL
HELP NEEDED FOR BATHING: 3 - A LITTLE
TOILETING: 3 - A LITTLE
AFFECT: WNL
HELP NEEDED FOR PERSONAL GROOMING: 3 - A LITTLE
MOVING TO AND FROM BED TO CHAIR: 3 - A LITTLE
STANDING UP FROM CHAIR USING ARMS: 3 - A LITTLE
DRESSING REGULAR LOWER BODY CLOTHING: 3 - A LITTLE
WALKING IN HOSPITAL ROOM: 3 - A LITTLE
EATING MEALS: 4 - NONE
CLIMB 3 TO 5 STEPS WITH RAILING: 3 - A LITTLE

## 2021-05-20 ASSESSMENT — PATIENT HEALTH QUESTIONNAIRE - PHQ9: SUM OF ALL RESPONSES TO PHQ9 QUESTIONS 1 & 2: 0

## 2021-05-20 NOTE — PERIOPERATIVE NURSING NOTE
Dr Salmon aware of lo bp with side laying position, no symptoms, pt states she has lo bp regularly and reports feeling good.

## 2021-05-20 NOTE — DISCHARGE INSTR - OTHER ORDERS
It is important that you quit smoking.  Please call [phone] to join [facility] [place] [facility]'s free tobacco cessation program.

## 2021-05-20 NOTE — OR SURGEON
Pre-Procedure patient identification:  I am the primary operating surgeon/proceduralist and I have identified the patient on 05/20/21 at 8:55 AM Skip Golden MD  Phone Number: [phone]

## 2021-05-20 NOTE — ASSESSMENT & PLAN NOTE
Blood pressure is running on the low side but asymptomatic  Patient says she always has low blood pressure which is around 90/50  We will continue to monitor

## 2021-05-20 NOTE — ANESTHESIA POSTPROCEDURE EVALUATION
Patient: Sharifa Sal    Procedure Summary     Date: 05/20/21 Room / Location: [facility] OR 04 / [facility]    Anesthesia Start: 0940 Anesthesia Stop: 1232    Procedure: Posterior L5-S1 Decompression, Instrumented Fusion, Autograft, Allograft (N/A Back) Diagnosis:       Congenital spondylolisthesis      (Congenital spondylolisthesis [Q76.2])    Surgeons: Skip Golden MD Responsible Provider: Michael Salmon MD    Anesthesia Type: general ASA Status: 2          Anesthesia Type: general  PACU Vitals    No data found in the last 10 encounters.           Anesthesia Post Evaluation    Pain management: adequate  Patient location during evaluation: PACU  Patient participation: complete - patient participated  Level of consciousness: awake and alert  Cardiovascular status: acceptable  Airway Patency: adequate  Respiratory status: acceptable  Hydration status: acceptable  Anesthetic complications: no

## 2021-05-20 NOTE — PROGRESS NOTES
Patient: Sharifa Sal  Location: [facility] 5PAV [id number]  MRN: [id number]  Today's date: 5/20/2021    Patient seated in recliner s/p PT session, BLE elevated, SCD's activated, VSS, NAD, no c/o chest pain, SOB or nausea, no neuro signs/symptoms, [place] Alarm re-set, Nurse notified of PT session results and recommendations     Sharifa is a 40 y.o. female admitted on 5/20/2021 with Congenital spondylolisthesis [Q76.2]  S/P lumbar fusion [Z98.1].     Past Medical History  Sharifa has a past medical history of Anxiety, Gluten intolerance, H/O migraine, Numbness and tingling, and Spondylolisthesis.    History of Present Illness   39 y/o female from home w/ children and fiance adm 5/20/21 s/p L5-S1 PLDF by Dr Golden, RLE symptoms PTA resolved now; h/o anxiety, low BP       PT Vitals    Date/Time Pulse HR Source BP BP Location BP Method Pt Position [facility]   05/20/21 1510 72 Monitor 106/55 Right upper arm Automatic Sitting RCS      PT Pain    Date/Time Pain Type Rating: Rest Rating: Activity Interventions [facility]   05/20/21 1455 Pain Assessment 2 - mild pain 2 - mild pain pillow support provided;position adjusted;premedicated for activity DGF          Prior Living Environment      Most Recent Value   People in Home  child(raven), dependent, significant other   Current Living Arrangements  home/apartment/condo   Home Accessibility  stairs to enter home (Group)   Number of Stairs, Main Entrance  2          Prior Level of Function      Most Recent Value   Dominant Hand  right   Ambulation  independent   Transferring  independent   Toileting  independent   Bathing  independent   Dressing  independent   Eating  independent   Communication  understands/communicates without difficulty   Swallowing  swallows foods/liquids without difficulty   Baseline Diet/Method of Nutritional Intake  no diet restrictions   Prior Level of Function Comment  lives w/ 4 children, fiance, 2SH, 2STE, no AD, tub shower, no powder room on 1st fl, , 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 [facility]    Assistive Device Currently Used at Home  none          PT Evaluation and Treatment - 05/20/21 1455        PT Time Calculation    Start Time  1455     Stop Time  1520     Time Calculation (min)  25 min        Session Details    Document Type  initial evaluation     Mode of Treatment  physical therapy        General Information    Patient Profile Reviewed  yes     Onset of Illness/Injury or Date of Surgery  05/20/21     Referring Physician  Dr Golden      Patient/Family/Caregiver Comments/Observations  cleared by 5p nurse      General Observations of Patient  39 y/o female from home w/ children and fiance adm 5/20/21 s/p L5-S1 PLDF by Dr Golden, RLE symptoms PTA resolved now; h/o anxiety, low BP      Existing Precautions/Restrictions  cardiac;fall;head of bed elevated 30 degrees;spinal    no brace ordered, NO BLT, OOB day 0, I+O, SCD's        Living Environment    Primary Care Provided by  self        Cognition/Psychosocial    Affect/Mental Status (Cognition)  WNL     Orientation Status (Cognition)  oriented x 4     Follows Commands (Cognition)  WNL     Cognitive Function  WNL        Sensory    Hearing Status  WFL        Vision Assessment/Intervention    Visual Impairment/Limitations  corrective lenses full-time        Sensory Assessment (Somatosensory)    Sensory Assessment (Somatosensory)  sensation intact        Range of Motion (ROM)    Range of Motion  ROM is WFL     Comment: Range of Motion  neck/back NT         Range of Motion Comprehensive    Comprehensive Range of Motion  ROM is WFL        Strength (Manual Muscle Testing)    Strength (Manual Muscle Testing)  strength is WFL        Strength Comprehensive (MMT)    Comprehensive MMT Assessment  no strength deficits identified     Comment  neck/back NT         Bed Mobility    [place], Supine to Sit  supervision;verbal cues;nonverbal cues (demo/gesture)     Verbal Cues (Supine to Sit)  maintaining precautions;technique     Assistive Device   head of bed elevated     Comment (Bed Mobility)  spinal precs followed         Transfers    Transfers  toilet transfer;tub transfer;car transfer        Sit to Stand Transfer    [place], Sit to Stand Transfer  supervision;verbal cues;nonverbal cues (demo/gesture)     Verbal Cues  hand placement;maintaining precautions;technique     Assistive Device  none        Stand to Sit Transfer    [place], Stand to Sit Transfer  supervision;verbal cues;nonverbal cues (demo/gesture)     Verbal Cues  hand placement;preparatory posture;maintaining precautions;technique     Assistive Device  none        Gait Training    [place], Gait  supervision;verbal cues;nonverbal cues (demo/gesture)     Assistive Device  none     Distance in Feet  200 feet     Pattern (Gait)  step-through     Deviations/Abnormal Patterns (Gait)  bilateral deviations;[place] decreased;gait speed decreased;step length decreased;stride length decreased     Bilateral Gait Deviations  heel strike decreased     Advanced Gait Activity  curb negotiation;10 Meter Walk Test (Self-Selected Velocity)     Comment (Gait/Stairs)  amb 200 ft on 5p w/o AD at S for vcs to improve gait pattern, VSS/NAD, spinal precs followed, nurse notified         Safety Issues, Functional Mobility    Comment, Safety Issues/Impairments (Mobility)  none        Balance    Balance Assessment  sitting static balance;sitting dynamic balance;sit to stand dynamic balance;standing dynamic balance;standing static balance     Static Sitting Balance  WFL     Dynamic Sitting Balance  WFL     Sit to Stand Dynamic Balance  WFL     Static Standing Balance  WFL     Dynamic Standing Balance  WFL        Motor Skills    Motor Skills  coordination;functional endurance     Coordination  WFL;heel to shin     Functional Endurance  WFL        AM-PAC (TM) - Mobility (Current Function)    Turning from your back to your side while in a flat bed without using bedrails?  4 - None     Moving from lying on your  back to sitting on the side of a flat bed without using bedrails?  4 - None     Moving to and from a bed to a chair?  3 - A Little     Standing up from a chair using your arms?  3 - A Little     To walk in a hospital room?  3 - A Little     Climbing 3-5 steps with a railing?  3 - A Little     AM-PAC (TM) Mobility Score  20        Therapy Assessment/Plan (PT)    Rehab Potential (PT)  good, to achieve stated therapy goals     Therapy Frequency (PT)  daily     Predicted Duration of Therapy Intervention (PT)  1 day      Criteria for Skilled Interventions Met (PT)  yes     Functional Level at Time of Evaluation (PT)  S     PT Diagnosis (PT)  39 y/o female from home w/ children and fiance adm 5/20/21 s/p L5-S1 PLDF by Dr Golden, RLE symptoms PTA resolved now; h/o anxiety, low BP      Patient/Family Therapy Goals Statement (PT)  go home tomorrow         Progress Summary (PT)    Daily Outcome Statement (PT)  S func mob, amb [facility] AD at S[facility], DC home Friday w/ family assist, OP PT      Symptoms Noted During/After Treatment  none     Progress Toward Functional Goals (PT)  progressing toward functional goals as expected        Therapy Plan Review/Discharge Plan (PT)    PT Recommended Discharge Disposition  home with assist;home with outpatient services     Anticipated Equipment Needs at Discharge (PT)  none     Patient/Family Concerns, Discharge Disposition (PT)  none     Patient/Family Concerns, Equipment Needs (PT Eval)  none     Therapy Plan Review (PT)  evaluation/treatment results reviewed;participants included;patient         Education Documentation  Call Light Use, taught by Harrison Alberts, PT at 5/20/2021  3:41 PM.  Learner: Patient  Readiness: Acceptance  Method: Explanation, Demonstration  Response: Verbalizes Understanding, Demonstrated Understanding  Comment: bed mob, log roll, spinal precs, xfers, GT, PT POC, safety at home, fall risk reduction, skin protection, ADL's, dressing, grooming, bathroom xfers,  importance of early/daily mobility to prevent health risks, all patient concerns addressed    VTE Symptoms, taught by Harrison Alberts, PT at 5/20/2021  3:41 PM.  Learner: Patient  Readiness: Acceptance  Method: Explanation, Demonstration  Response: Verbalizes Understanding, Demonstrated Understanding  Comment: bed mob, log roll, spinal precs, xfers, GT, PT POC, safety at home, fall risk reduction, skin protection, ADL's, dressing, grooming, bathroom xfers, importance of early/daily mobility to prevent health risks, all patient concerns addressed    VTE Prevention, taught by Harrison Alberts, PT at 5/20/2021  3:41 PM.  Learner: Patient  Readiness: Acceptance  Method: Explanation, Demonstration  Response: Verbalizes Understanding, Demonstrated Understanding  Comment: bed mob, log roll, spinal precs, xfers, GT, PT POC, safety at home, fall risk reduction, skin protection, ADL's, dressing, grooming, bathroom xfers, importance of early/daily mobility to prevent health risks, all patient concerns addressed          PT Goals      Most Recent Value   Bed Mobility Goal 1   Activity/Assistive Device  bed mobility activities, all at 05/20/2021 1455   [place]  independent at 05/20/2021 1455   Time Frame  by discharge at 05/20/2021 1455   Progress/Outcome  goal ongoing at 05/20/2021 1455   Transfer Goal 1   Activity/Assistive Device  all transfers at 05/20/2021 1455   [place]  independent at 05/20/2021 1455   Time Frame  by discharge at 05/20/2021 1455   Progress/Outcome  goal ongoing at 05/20/2021 1455   Gait Training Goal 1   Activity/Assistive Device  gait (walking locomotion) at 05/20/2021 1455   [place]  independent at 05/20/2021 1455   Distance  200 at 05/20/2021 1455   Time Frame  by discharge at 05/20/2021 1455   Progress/Outcome  goal ongoing at 05/20/2021 1455   Stairs Goal 1   Activity/Assistive Device  ascending stairs, descending stairs, no assistive device at 05/20/2021 1455   [place]   independent at 05/20/2021 1455   Number of Stairs  12 at 05/20/2021 1455   Time Frame  by discharge at 05/20/2021 1455   Progress/Outcome  goal ongoing at 05/20/2021 1455   Precaution Goal 1   Activity  spinal precautions at 05/20/2021 1455   [place]  independently at 05/20/2021 1455   Time Frame  by discharge at 05/20/2021 1455   Progress/Outcome  goal ongoing at 05/20/2021 1455

## 2021-05-20 NOTE — PLAN OF CARE
Problem: Adult Inpatient Plan of Care  Goal: Plan of Care Review  Outcome: Progressing  Flowsheets (Taken 5/20/2021 7521)  Progress: improving  Plan of Care Reviewed With: patient  Outcome Summary: S func mob, amb w/o AD at S, [facility] 20/24, DC home Friday w/ family assist, OP PT

## 2021-05-20 NOTE — PLAN OF CARE
Plan of Care Review  Plan of Care Reviewed With: patient  Progress: improving  Outcome Summary: pt seen by PT/OT.  OOB X 1 assist with walker.  flexeril for pain.  tolerating PO intake.  perez catheter.  hemovac compressed.

## 2021-05-20 NOTE — PROGRESS NOTES
Orthopaedic Spine Surgery Postoperative Check    [S]  Patient doing well postoperatively, resting comfortably in PACU. Pain controlled. Breathing comfortably on room air.    [O]    Vitals:    05/20/21 1300   BP: (!) 87/53   Pulse: 66   Resp: (!) 33   Temp:    SpO2: 100%         Physical Exam    Lumbar dressing in place, clean, dry, intact.  Drain: HV x1    RUE  Inspection: No gross deformity. Skin in tact.  Neurovascular: Palpable radial pulse. SILT C4-T1 distribution.  Motor: Deltoid 5/5, biceps 5/5, triceps 5/5, wrist extensors 5/5, hand intrinsics 5/5    LUE  Inspection: No gross deformity. Skin in tact.  Neurovascular: Palpable radial pulse. SILT C4-T1 distribution.  Motor: Deltoid 5/5, biceps 5/5, triceps 5/5, wrist extensors 5/5, hand intrinsics 5/5    RLE:  Inspection: No gross deformity. Skin in tact.   Neurovascular: Palpable DP pulse. SILT L2-S1 distibution.  Motor: IP 5/5, Quad 5/5, TA 5/5, EHL 5/5, GS 5/5    LLE:  Inspection: No gross deformity. Skin in tact.   Neurovascular: Palpable DP pulse. SILT L2-S1 distibution.  Motor: IP 5/5, Quad 5/5, TA 5/5, EHL 5/5, GS 5/5    AP: 40 y.o. female POD 0 s/p L5-S1 TLIF    -- Pain control  -- WB: WBAT  -- DVT: SQH  -- Advance diet as tolerated  -- Monitor DONALD drain output  -- Continue perez  -- PT/OT  -- Monitor neurovascular status    Raymundo Lee MD

## 2021-05-20 NOTE — HOSPITAL COURSE
Sharifa is a 40 y.o. female admitted on 5/20/2021 with Congenital spondylolisthesis [Q76.2]  S/P lumbar fusion [Z98.1]. Principal problem is No Principal Problem: There is no principal problem currently on the Problem List. Please update the Problem List and refresh..    Past Medical History  Sharifa has a past medical history of Anxiety, Gluten intolerance, H/O migraine, Numbness and tingling, and Spondylolisthesis.    History of Present Illness   39 y/o female from home w/ children and fiance adm 5/20/21 s/p L5-S1 PLDF by Dr Golden, RLE symptoms PTA resolved now; h/o anxiety, low BP

## 2021-05-20 NOTE — PERIOPERATIVE NURSING NOTE
DR Lee at bedside to see pt.   BP reading low since pt turned on side.   NO complaints or symtoms noted.  Continue to monitor pt

## 2021-05-20 NOTE — ANESTHESIOLOGIST PRE-PROCEDURE ATTESTATION
Pre-Procedure Patient Identification:  I am the Primary Anesthesiologist and have identified the patient on 05/20/21 at 9:26 AM.   I have confirmed the following procedure(s) Posterior L5-S1 Decompression, Instrumented Fusion, Autograft, Allograft will be performed by the following surgeon/proceduralist Skip Golden MD.

## 2021-05-20 NOTE — CONSULTS
Hospital Medicine Service -  Daily Progress Note       SUBJECTIVE   Interval History: Patient seen and examined in PACU  S/p L5-S1 decompression fusion  Back pain is under control  Blood pressure running on the low side but patient is asymptomatic  Denies any chest pain or shortness of breath     OBJECTIVE      Vital signs in last 24 hours:  Temp:  [36.5 °C (97.7 °F)-36.6 °C (97.8 °F)] 36.6 °C (97.8 °F)  Heart Rate:  [53-68] 57  Resp:  [13-44] 16  BP: ()/(40-73) 90/55    Intake/Output Summary (Last 24 hours) at 5/20/2021 1352  Last data filed at 5/20/2021 1327  Gross per 24 hour   Intake 1050 ml   Output 600 ml   Net 450 ml       PHYSICAL EXAMINATION      Physical Exam  General appearance: alert, appears stated age, cooperative, non-toxic  Head: normocephalic, without obvious abnormality, atraumatic  Eyes: conjunctivae clear. PERRL, EOMI's intact.  Lungs: clear to auscultation bilaterally   Heart: regular rate and rhythm, S1, S2 normal,   Abdomen: Nondistended, +BS, soft, non-tender, no masses palpable   Extremities: no edema  Pulses: 2+ and symmetric B/L DP  Neurologic: Alert and oriented X 3, no focal deficits  Skin: intact, no rashes or lesions         LINES, CATHETERS, DRAINS, AIRWAYS, AND WOUNDS   Lines, Drains, and Airways:  Wounds (agree with documentation and present on admission):  Peripheral IV 05/20/21 Anterior;Left Forearm (Active)   Number of days: 0       Drain 1 Right Back 10 Fr. (Active)   Number of days: 0         Comments:      LABS / IMAGING / TELE      Labs  I have reviewed the patient's pertinent labs. Pertinent labs are within normal limits.    SARS-CoV-2 (COVID-19) (no units)   Date/Time Value   05/17/2021 1014 Negative       Imaging  I have independently reviewed the pertinent imaging from the last 24 hrs.    ECG/Telemetry  I have independently reviewed the telemetry. No events for the last 24 hours.    ASSESSMENT AND PLAN      S/P lumbar spinal fusion  Assessment & Plan  S/p L5-S1  lumbar decompression and fusion- postop day 0  Pain control as per surgeon  Encourage Incentive [place]  Pt/Ot  Recommend bowel regimen  dvt prophylaxis per surgery    Anxiety  Assessment & Plan  Continue Zoloft and Klonopin as needed.    Low blood pressure  Assessment & Plan  Blood pressure is running on the low side in PACU  Patient says she also has a low blood pressure on 9050 and she is completely asymptomatic  We will continue to monitor for the moment         VTE Assessment: [place]    VTE Prophylaxis:  Current anticoagulants:    •None      Code Status: No Order      Estimated Discharge Date:    Disposition Planning: as per ortho     Stefano Lorenz MD  5/20/2021

## 2021-05-20 NOTE — ASSESSMENT & PLAN NOTE
S/p L5-S1 lumbar decompression and fusion- postop day 1  Pain control as per surgeon  Encourage Incentive [place]  Pt/Ot  Recommend bowel regimen  dvt prophylaxis per surgery

## 2021-05-20 NOTE — PLAN OF CARE
Problem: Adult Inpatient Plan of Care  Goal: Plan of Care Review  Outcome: Progressing  Flowsheets (Taken 5/20/2021 4062)  Progress: improving  Plan of Care Reviewed With: patient  Outcome Summary: OT eval completed, pt overall doing very well POD0. SUP for functional txfers and SUP for ADLs, no-minimal pain noted. Educated on 3/3 spinal px and able to maintain t/o session. Cont OT POC, anticipate 1-2 more OT sessions, rec DC home with assist.

## 2021-05-20 NOTE — OP NOTE
Attending Surgeon: Skip Golden MD    Assistant: Ortho Resident    Preoperative diagnosis: L5-S1 Isthmic Spondylolisthesis    Postoperative Diagnosis: Same    Procedure: L5-S1 Decompression, Instrumented Interbody Fusion and Posterior Fusion    Anesthesia: GET    EBL: 300 cc    Instrumentation: Globus    Status: Stable to PACU    INDICATIONS:  Sharifa Sal is a pleasant 40 y.o. female who presented to my office complaining of back and leg pain, numbness and tingling. Symptoms were progressive and were significantly affecting the patient's activity level and quality of life. Symptoms were recalcitrant to nonsurgical management. I examined the patient and reviewed the imaging studies. Imaging was notable for L5-S1 Isthmic Spondylolisthesis. I discussed my findings with the patient. We discussed both surgical and nonsurgical treatment options. The patient was aware that nonsurgical options were available but did risk persistence and potential progression of symptoms. Surgical intervention was also discussed. This would involve a L5-S1 TLIF. The procedure was discussed in detail. The risks were also discussed. The patient demonstrated good understanding of the risks, benefits and alternatives and elected undergo surgery and signed the appropriate consent forms.    TECHNIQUE:  The patient was identified in the preoperative holding area. The surgical site was marked in the procedure was confirmed. The patient was taken to the operating room. Bilateral SCDs were applied. General anesthesia was induced and endotracheal intubation was performed. The patient was placed prone on the operating table. Arms were placed on arm boards and all bony prominences were well padded. The patient was prepped and draped in the usual sterile fashion. A surgical timeout was performed. Perioperative antibiotics were administered.    The paraspinal muscles were infiltrated with 30 mL of half percent Marcaine with epinephrine. A midline  lumbar incision was made. A standard approach to the lumbar spine was performed. A localizing film was taken to confirm the level. After confirming the level, the exposure was completed. An L5 Cordero laminectomy was performed. The pedicles were skeletonized. Bilaterally the nerve root and dural sac were identified and gently retracted medially and protected with a nerve root retractor. A complete discectomy of performed. The endplates were denuded of cartilage. The disc space was irrigated. The disc space was packed local autograft and allograft. An interbody cage was packed with local autograft and inserted into the disc space. The decompression was taken lateral out to the medial wall of the L5 & S1 pedicles. The bilateral L5-S1 foramen and lateral recess were decompressed. Upon completion of the decompression there was no further central stenosis. The bilateral L5 & S1 nerves exited the spine without compression. Pedicle screws were placed bilaterally at L5 & S1. The pedicles were palpated to insure there was no breach. Rods were secured to the pedicle screws with set screws which were final tightened. Final AP and lateral images were taken to confirm appropriate positioning. The wound was copiously irrigated. Hemostasis was achieved. The posterolateral elements were decorticated and autograft and allograft was placed over the decorticated surfaces.   The wound was closed in multiple layers and sterile dressings were placed.    General anesthasia was discontinued and extubation was performed. The patient woke up grossly moving the lower extremities bilaterally. The patient was then taken to the recovery room in stable condition.    At the completion the case needle and sponge accounts were correct x2.    I was present and participated in all key aspects of the surgical procedure.

## 2021-05-20 NOTE — PROGRESS NOTES
Patient: Sharifa Sal  Location: [facility] 5PAV 5454  MRN: [id number]  Today's date: 5/20/2021    Pt seated OOB in chair, posey alarm on, call bell/personal items within reach, VSS/NAD, RN aware of session/ pt status.  Sharifa is a 40 y.o. female admitted on 5/20/2021 with Congenital spondylolisthesis [Q76.2]  S/P lumbar fusion [Z98.1]. Principal problem is No Principal Problem: There is no principal problem currently on the Problem List. Please update the Problem List and refresh..    Past Medical History  Sharifa has a past medical history of Anxiety, Gluten intolerance, H/O migraine, Numbness and tingling, and Spondylolisthesis.    History of Present Illness   39 y/o female from home w/ children and fiance adm 5/20/21 s/p L5-S1 PLDF by Dr Golden, RLE symptoms PTA resolved now; h/o anxiety, low BP     Therapy Pain/Vitals     Row Name 05/20/21 1455          Pain/Comfort/Sleep    Pain Charting Type  Pain Assessment     Preferred Pain Scale  word (verbal rating pain scale)     Word Pain Rating: Rest  2 - mild pain     Word Pain Rating: Activity  2 - mild pain     Pain Management Interventions  pillow support provided;position adjusted;premedicated for activity        Vital Signs    Pulse  67     SpO2  99 %     Patient Activity  At rest     Oxygen Therapy  None (Room air)     BP  (!) 100/49     Patient Position  Lying         OT Vitals    Date/Time Pulse HR Source BP BP Location BP Method Pt Position [facility]   05/20/21 1510 72 Monitor 106/55 Right upper arm Automatic Sitting RCS          Prior Living Environment      Most Recent Value   People in Home  child(raven), dependent, significant other   Current Living Arrangements  home/apartment/condo   Home Accessibility  stairs to enter home (Group)   Number of Stairs, Main Entrance  2          Prior Level of Function      Most Recent Value   Dominant Hand  right   Ambulation  independent   Transferring  independent   Toileting  independent   Bathing  independent    Dressing  independent   Eating  independent   Communication  understands/communicates without difficulty   Swallowing  swallows foods/liquids without difficulty   Baseline Diet/Method of Nutritional Intake  no diet restrictions   Prior Level of Function Comment  lives w/ 4 children, fiance, 2SH, 2STE, no AD, tub shower, no powder room on 1st fl, , 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 PT present for cotx.     Existing Precautions/Restrictions  fall;head of bed elevated 30 degrees;spinal    no brace in order set       Cognition/Psychosocial    Affect/Mental Status (Cognition)  WNL     Orientation Status (Cognition)  oriented x 4     Follows Commands (Cognition)  WNL     Cognitive Function  WNL     Comment, Cognition  Pleasant and cooerative to work with        Hearing Assessment    Hearing Status  WFL        Vision Assessment/Intervention    Visual Impairment/Limitations  corrective lenses full-time        Sensory Assessment (Somatosensory)    Sensory Assessment (Somatosensory)  UE sensation intact        Range of Motion (ROM)    Range of Motion  bilateral upper extremities;ROM is WFL        Strength (Manual Muscle Testing)    Strength (Manual Muscle Testing)  bilateral upper extremities;strength is WFL     Shoulder, Left (Strength)  5      Elbow, Left (Strength)  5     Wrist, Left (Strength)  5     Hand, Left (Strength)  5     Shoulder, Right (Strength)  5     Elbow, Right (Strength)  5     Wrist, Right (Strength)  5     Hand, Right (Strength)  5        Bed Mobility    Bells, Supine to Sit  supervision     Assistive Device  head of bed elevated;bed rails     Comment (Bed Mobility)  Educated on log roll technique to maintain spinal px        Transfers    Transfers  toilet transfer        Sit to Stand Transfer    [place], Sit to Stand Transfer  supervision     Verbal Cues  hand placement     Assistive Device  none        Stand to Sit Transfer    Bells, Stand to Sit Transfer  supervision     Verbal Cues  hand placement     Assistive Device  none        Toilet Transfer    Transfer Technique  sit-stand;stand-sit     Bells, Toilet Transfer  supervision     Assistive Device  none        Balance    Static Sitting Balance  WFL     Dynamic Sitting Balance  WFL     Sit to Stand Dynamic Balance  WFL        Motor Skills    Functional Endurance  WFL POD0; able to ambulate >household distances without AD        Lower Body Dressing    Tasks  don;socks     Bells  supervision     Position  edge of bed sitting     Comment  Educated on crossed leg technique in order to maintain spinal px, pt able to perform        Toileting    Adaptive Equipment  --    [place]    Comment  Offered/declined. Agreeable to toilet txfer (see above)        BADL Safety/Performance    Impairments, BADL Safety/Performance  endurance/activity tolerance        ADL Interventions    Energy Conservation Techniques  correct posture facilitated;correct body mechanics utilized    Educated on 3/3 spinal px       AM-PAC (TM) - ADL (Current Function)    Putting on and taking off regular lower body clothing?  3 - A Little     Bathing?  3 - A Little     Toileting?  3 - A Little     Putting on/taking off regular upper body clothing?  3 - A Little     How much help for taking care  of personal grooming?  3 - A Little     Eating meals?  4 - None     AM-PAC (TM) ADL Score  19        Therapy Assessment/Plan (OT)    Rehab Potential (OT)  good, to achieve stated therapy goals     Therapy Frequency (OT)  3-5 times/wk        Progress Summary (OT)    Daily Outcome Statement (OT)  OT eval completed, pt overall doing very well POD0. SUP for functional txfers and SUP for ADLs, no-minimal pain noted. Educated on 3/3 spinal px and able to maintain t/o session. Cont OT POC, anticipate 1-2 more OT sessions, rec DC home with assist.        Therapy Plan Review/Discharge Plan (OT)    OT Recommended Discharge Disposition  home with assist     Anticipated Equipment Needs At Discharge (OT)  none    likely will not require                 Education Documentation  Activity, taught by Verónica Modi, OT at 5/20/2021  3:46 PM.  Learner: Patient  Readiness: Acceptance  Method: Explanation  Response: Verbalizes Understanding  Comment: OT POC, OTs role, ADls, safety, spinal px               OT Goals      Most Recent Value   Bed Mobility Goal 1   Activity/Assistive Device  bed mobility activities, all at 05/20/2021 1503   [place]  independent at 05/20/2021 1503   Time Frame  short-term goal (STG) at 05/20/2021 1503   Progress/Outcome  goal ongoing at 05/20/2021 1503   Transfer Goal 1   Activity/Assistive Device  all transfers, toilet, tub at 05/20/2021 1503   [place]  independent at 05/20/2021 1503   Time Frame  short-term goal (STG) at 05/20/2021 1503   Progress/Outcome  goal ongoing at 05/20/2021 1503   Dressing Goal 1   Activity/Adaptive Equipment  dressing skills, all at 05/20/2021 1503   [place]  independent at 05/20/2021 1503   Time Frame  short-term goal (STG) at 05/20/2021 1503   Progress/Outcome  goal ongoing at 05/20/2021 1503

## 2021-05-20 NOTE — ANESTHESIA PROCEDURE NOTES
Airway  Urgency: elective    Start Time: 5/20/2021 9:46 AM    General Information and Staff    Patient location during procedure: OR  Anesthesiologist: Michael Salmon MD    Indications and Patient Condition  Indications for airway management: anesthesia  Sedation level: deep  Preoxygenated: yes  Patient position: sniffing  MILS maintained throughout  Mask difficulty assessment: 1 - vent by mask    Final Airway Details  Final airway type: endotracheal airway      Successful airway: ETT    Successful intubation technique: direct laryngoscopy  Facilitating devices/methods: intubating stylet  Endotracheal tube insertion site: oral  Blade: [place]  Blade size: #2  ETT size (mm): 7.0  Cormack-Lehane Classification: grade I - full view of glottis  Placement verified by: chest auscultation and capnometry   Measured from: lips  ETT to lips (cm): 22  Number of attempts at approach: 1  Number of other approaches attempted: 0  Atraumatic airway insertion

## 2021-05-20 NOTE — PLAN OF CARE
Plan of Care Review  Plan of Care Reviewed With: patient  Progress: improving  Outcome Summary: mepilex cd&i, asisted to L side lying-more comfortable pain 3/10. hemovac in place. +pulses, RA. perez catheter n place. BP low-but atient states this is her normal. - no S/S

## 2021-05-21 VITALS
TEMPERATURE: 98.1 F | SYSTOLIC BLOOD PRESSURE: 109 MMHG | RESPIRATION RATE: 18 BRPM | WEIGHT: 145 LBS | OXYGEN SATURATION: 98 % | HEART RATE: 94 BPM | DIASTOLIC BLOOD PRESSURE: 56 MMHG | HEIGHT: 67 IN | BODY MASS INDEX: 22.76 KG/M2

## 2021-05-21 LAB
ANION GAP SERPL CALC-SCNC: 5 MEQ/L (ref 3–15)
BUN SERPL-MCNC: 12 MG/DL (ref 8–20)
CALCIUM SERPL-MCNC: 8.2 MG/DL (ref 8.9–10.3)
CHLORIDE SERPL-SCNC: 107 MEQ/L (ref 98–109)
CO2 SERPL-SCNC: 23 MEQ/L (ref 22–32)
CREAT SERPL-MCNC: 0.6 MG/DL (ref 0.6–1.1)
ERYTHROCYTE [DISTWIDTH] IN BLOOD BY AUTOMATED COUNT: 12.3 % (ref 11.7–14.4)
GFR SERPL CREATININE-BSD FRML MDRD: >60 ML/MIN/1.73M*2
GLUCOSE SERPL-MCNC: 97 MG/DL (ref 70–99)
HCT VFR BLDCO AUTO: 29.8 % (ref 35–45)
HGB BLD-MCNC: 10 G/DL (ref 11.8–15.7)
MCH RBC QN AUTO: 32.9 PG (ref 28–33.2)
MCHC RBC AUTO-ENTMCNC: 33.6 G/DL (ref 32.2–35.5)
MCV RBC AUTO: 98 FL (ref 83–98)
PDW BLD AUTO: 10.5 FL (ref 9.4–12.3)
PLATELET # BLD AUTO: 157 K/UL (ref 150–369)
POTASSIUM SERPL-SCNC: 3.9 MEQ/L (ref 3.6–5.1)
RBC # BLD AUTO: 3.04 M/UL (ref 3.93–5.22)
SODIUM SERPL-SCNC: 135 MEQ/L (ref 136–144)
WBC # BLD AUTO: 11.07 K/UL (ref 3.8–10.5)

## 2021-05-21 PROCEDURE — 97535 SELF CARE MNGMENT TRAINING: CPT | Mod: GO

## 2021-05-21 PROCEDURE — 36415 COLL VENOUS BLD VENIPUNCTURE: CPT | Performed by: NURSE PRACTITIONER

## 2021-05-21 PROCEDURE — 63700000 HC SELF-ADMINISTRABLE DRUG: Performed by: NURSE PRACTITIONER

## 2021-05-21 PROCEDURE — 85027 COMPLETE CBC AUTOMATED: CPT | Performed by: NURSE PRACTITIONER

## 2021-05-21 PROCEDURE — 99225 PR SBSQ OBSERVATION CARE/DAY 25 MINUTES: CPT | Performed by: HOSPITALIST

## 2021-05-21 PROCEDURE — 80048 BASIC METABOLIC PNL TOTAL CA: CPT | Performed by: NURSE PRACTITIONER

## 2021-05-21 PROCEDURE — 63600000 HC DRUGS/DETAIL CODE: Performed by: NURSE PRACTITIONER

## 2021-05-21 PROCEDURE — 25800000 HC PHARMACY IV SOLUTIONS: Performed by: NURSE PRACTITIONER

## 2021-05-21 PROCEDURE — 97116 GAIT TRAINING THERAPY: CPT | Mod: GP

## 2021-05-21 RX ORDER — CYCLOBENZAPRINE HCL 10 MG
10 TABLET ORAL 3 TIMES DAILY PRN
Qty: 30 TABLET | Refills: 0
Start: 2021-05-21 | End: 2021-05-31

## 2021-05-21 RX ORDER — HYDROCODONE BITARTRATE AND ACETAMINOPHEN 5; 325 MG/1; MG/1
1-2 TABLET ORAL EVERY 4 HOURS PRN
Start: 2021-05-21 | End: 2021-05-26

## 2021-05-21 RX ORDER — PHENOL 1.4 %
1 AEROSOL, SPRAY (ML) MUCOUS MEMBRANE DAILY
Start: 2021-05-21 | End: 2021-06-20

## 2021-05-21 RX ORDER — DOCUSATE SODIUM 100 MG/1
100 CAPSULE, LIQUID FILLED ORAL 2 TIMES DAILY PRN
Start: 2021-05-21 | End: 2021-06-20

## 2021-05-21 RX ADMIN — SODIUM CHLORIDE: 9 INJECTION, SOLUTION INTRAVENOUS at 04:50

## 2021-05-21 RX ADMIN — SERTRALINE HYDROCHLORIDE 50 MG: 50 TABLET ORAL at 08:30

## 2021-05-21 RX ADMIN — HEPARIN SODIUM 5000 UNITS: 5000 INJECTION, SOLUTION INTRAVENOUS; SUBCUTANEOUS at 05:32

## 2021-05-21 RX ADMIN — DEXAMETHASONE 6 MG: 4 TABLET ORAL at 08:30

## 2021-05-21 RX ADMIN — OXYCODONE HYDROCHLORIDE 5 MG: 5 TABLET ORAL at 14:59

## 2021-05-21 RX ADMIN — POLYETHYLENE GLYCOL 3350 17 G: 17 POWDER, FOR SOLUTION ORAL at 08:30

## 2021-05-21 RX ADMIN — ACETAMINOPHEN 650 MG: 325 TABLET, FILM COATED ORAL at 05:32

## 2021-05-21 RX ADMIN — CEFAZOLIN 2 G: 330 INJECTION, POWDER, FOR SOLUTION INTRAMUSCULAR; INTRAVENOUS at 02:11

## 2021-05-21 RX ADMIN — ACETAMINOPHEN 650 MG: 325 TABLET, FILM COATED ORAL at 11:31

## 2021-05-21 RX ADMIN — SENNOSIDES, DOCUSATE SODIUM 1 TABLET: 8.6; 5 TABLET ORAL at 08:30

## 2021-05-21 ASSESSMENT — COGNITIVE AND FUNCTIONAL STATUS - GENERAL
AFFECT: WFL
HELP NEEDED FOR PERSONAL GROOMING: 4 - NONE
TOILETING: 4 - NONE
WALKING IN HOSPITAL ROOM: 4 - NONE
DRESSING REGULAR UPPER BODY CLOTHING: 4 - NONE
AFFECT: WNL
HELP NEEDED FOR BATHING: 4 - NONE
CLIMB 3 TO 5 STEPS WITH RAILING: 4 - NONE
MOVING TO AND FROM BED TO CHAIR: 4 - NONE
EATING MEALS: 4 - NONE
STANDING UP FROM CHAIR USING ARMS: 4 - NONE
DRESSING REGULAR LOWER BODY CLOTHING: 4 - NONE

## 2021-05-21 NOTE — PROGRESS NOTES
Patient: Sharifa Sal  Location: [facility] 5PAV 5454  MRN: [id number]  Today's date: 5/21/2021    Pt seated OOB in chair, call bell/personal items within reach, VSS/NAD, RN aware of session/ pt status.  Sharifa is a 40 y.o. female admitted on 5/20/2021 with Congenital spondylolisthesis [Q76.2]  S/P lumbar fusion [Z98.1]. Principal problem is No Principal Problem: There is no principal problem currently on the Problem List. Please update the Problem List and refresh..    Past Medical History  Sharifa has a past medical history of Anxiety, Gluten intolerance, H/O migraine, Numbness and tingling, and Spondylolisthesis.    History of Present Illness   41 y/o female from home w/ children and fiance adm 5/20/21 s/p L5-S1 PLDF by Dr Golden, RLE symptoms PTA resolved now; h/o anxiety, low BP       OT Vitals    Date/Time Pulse SpO2 Pt Activity O2 Therapy BP Pt Position Observations [facility]   05/21/21 0830 76 99 % At rest None (Room air) 93/53 Sitting Pt's BP runs low at baseline. EMS      OT Pain    Date/Time Pain Type Location Rating: Rest Rating: Activity Interventions [facility]   05/21/21 0829 Pain Assessment -- 6 -- -- CLB   05/21/21 0830 Pain Assessment back 6 6 position adjusted EMS          Prior Living Environment      Most Recent Value   People in Home  child(raven), dependent, significant other   Current Living Arrangements  home/apartment/condo   Home Accessibility  stairs to enter home (Group)   Number of Stairs, Main Entrance  2          Prior Level of Function      Most Recent Value   Dominant Hand  right   Ambulation  independent   Transferring  independent   Toileting  independent   Bathing  independent   Dressing  independent   Eating  independent   Communication  understands/communicates without difficulty   Swallowing  swallows foods/liquids without difficulty   Baseline Diet/Method of Nutritional Intake  no diet restrictions   Prior Level of Function Comment  lives w/ 4 children, fiance, 2SH, 2STE, no AD, tub  shower, no powder room on 1st fl, , 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[facility]    Assistive Device Currently Used at Home  none          Occupational Profile      Most Recent Value   Successful Occupations  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 PT and SPT present for cotx.     Existing Precautions/Restrictions  fall;spinal    no brace in order set       Cognition/Psychosocial    Affect/Mental Status (Cognition)  WNL     Orientation Status (Cognition)  oriented x 4     Follows Commands (Cognition)  WNL     Cognitive Function  WNL        Transfers    Transfers  tub transfer        Sit to Stand Transfer    [place], Sit to Stand Transfer  independent     Assistive Device  none        Stand to Sit Transfer    [place], Stand to Sit Transfer  independent     Assistive Device  none        Toilet Transfer    Transfer Technique  sit-stand;stand-sit     [place], Toilet Transfer  independent     Assistive Device  none        Tub Transfer    Transfer Technique  step over, right entry     [place], Tub Transfer  independent     Verbal Cues  hand placement;safety;technique     Assistive Device  none        Balance    Static Sitting Balance  WFL     Dynamic Sitting Balance  WFL     Sit to Stand Dynamic Balance  WFL     Static Standing Balance  WFL     Dynamic Standing Balance  WFL        Motor Skills    Coordination  WFL     Functional Endurance  WFL        Upper Body Dressing    Tasks   don;pull over garment;pajama/robe    x2    [place]  independent     Position  supported sitting        Lower Body Dressing    Tasks  don;shoes/slippers;socks;underwear;pants/bottoms     [place]  independent     Position  supported sitting     Comment  Re-educated on maintaining spinal px during LBD, pt able to do so without issue        Toileting    [place]  perform bladder hygiene;independent     Position  unsupported sitting        AM-PAC (TM) - ADL (Current Function)    Putting on and taking off regular lower body clothing?  4 - None     Bathing?  4 - None     Toileting?  4 - None     Putting on/taking off regular upper body clothing?  4 - None     How much help for taking care of personal grooming?  4 - None     Eating meals?  4 - None     AM-PAC (TM) ADL Score  24        Therapy Assessment/Plan (OT)    Rehab Potential (OT)  other (see comments)     Therapy Frequency (OT)  --    DC acute OT       Progress Summary (OT)    Daily Outcome Statement (OT)  OT tx session completed, pt overall IND with functional txfers and ADLs. Reports minimal pain. Has supportive fiance at home with her. DC acute OT, rec home upon DC.        Therapy Plan Review/Discharge Plan (OT)    OT Recommended Discharge Disposition  home     Anticipated Equipment Needs At Discharge (OT)  none                  Education Documentation  Treatment Plan, taught by Verónica Modi OT at 5/21/2021  9:11 AM.  Learner: Patient  Readiness: Acceptance  Method: Explanation  Response: Verbalizes Understanding  Comment: OT POC, OTs role, ADLs, safety, spinal px               OT Goals      Most Recent Value   Bed Mobility Goal 1   Activity/Assistive Device  bed mobility activities, all at 05/20/2021 1503   [place]  independent at 05/20/2021 1503   Time Frame  short-term goal (STG) at 05/20/2021 1503   Progress/Outcome  goal partially met at 05/21/2021 0824   Transfer Goal 1   Activity/Assistive Device  all transfers, toilet, tub at  05/20/2021 1503   [place]  independent at 05/20/2021 1503   Time Frame  short-term goal (STG) at 05/20/2021 1503   Progress/Outcome  goal met at 05/21/2021 0824   Dressing Goal 1   Activity/Adaptive Equipment  dressing skills, all at 05/20/2021 1503   [place]  independent at 05/20/2021 1503   Time Frame  short-term goal (STG) at 05/20/2021 1503   Progress/Outcome  goal met at 05/21/2021 0824

## 2021-05-21 NOTE — PROGRESS NOTES
Hospital Medicine Service -  Daily Progress Note       SUBJECTIVE   Interval History: Feeling better this morning  Back pain improving  Blood pressure on the low side but asymptomatic  Denies any chest pain or shortness of breath     OBJECTIVE      Vital signs in last 24 hours:  Temp:  [36.1 °C (97 °F)-36.6 °C (97.8 °F)] 36.4 °C (97.6 °F)  Heart Rate:  [] 76  Resp:  [13-44] 18  BP: ()/(40-60) 93/53    Intake/Output Summary (Last 24 hours) at 5/21/2021 1018  Last data filed at 5/21/2021 0832  Gross per 24 hour   Intake 750 ml   Output 1985 ml   Net -1235 ml       PHYSICAL EXAMINATION      Physical Exam  General appearance: alert, appears stated age, cooperative, non-toxic  Head: normocephalic, without obvious abnormality, atraumatic  Eyes: conjunctivae clear. PERRL, EOMI's intact.  Lungs: clear to auscultation bilaterally   Heart: regular rate and rhythm, S1, S2 normal,   Abdomen: Nondistended, +BS, soft, non-tender, no masses palpable   Extremities: no edema  Pulses: 2+ and symmetric B/L DP  Neurologic: Alert and oriented X 3, no focal deficits  Skin: intact, no rashes or lesions         LINES, CATHETERS, DRAINS, AIRWAYS, AND WOUNDS   Lines, Drains, and Airways:  Wounds (agree with documentation and present on admission):  Drain 1 Right Back 10 Fr. (Active)   Number of days: 1       Surgical Incision Back (Active)   Number of days: 1         Comments:      LABS / IMAGING / TELE      Labs  I have reviewed the patient's pertinent labs. Pertinent labs are within normal limits.    SARS-CoV-2 (COVID-19) (no units)   Date/Time Value   05/17/2021 1014 Negative       Imaging  I have independently reviewed the pertinent imaging from the last 24 hrs.    ECG/Telemetry  Patient is not on telemetry.    ASSESSMENT AND PLAN      S/P lumbar spinal fusion  Assessment & Plan  S/p L5-S1 lumbar decompression and fusion- postop day 1  Pain control as per surgeon  Encourage Incentive [place]  Pt/Ot  Recommend bowel  regimen  dvt prophylaxis per surgery    Anxiety  Assessment & Plan  Continue Zoloft and Klonopin as needed    Low blood pressure  Assessment & Plan  Blood pressure is running on the low side but asymptomatic  Patient says she always has low blood pressure which is around 90/50  We will continue to monitor         VTE Assessment: [place]    VTE Prophylaxis:  Current anticoagulants:  heparin (porcine) 5,000 unit/mL injection 5,000 Units, subcutaneous, q8h KAJAL      Code Status: Full Code      Estimated Discharge Date: 5/21/2021   Disposition Planning: as per ortho     Stefano Lorenz MD  5/21/2021

## 2021-05-21 NOTE — PLAN OF CARE
Problem: Adult Inpatient Plan of Care  Goal: Readiness for Transition of Care  Outcome: Progressing     Problem: Adult Inpatient Plan of Care  Goal: Readiness for Transition of Care  Intervention: Mutually Develop Transition Plan  Flowsheets (Taken 5/21/2021 7714)  Anticipated Discharge Disposition: home without services  Equipment Needed After Discharge: walker, front-wheeled  Assistive Device/Animal Currently Used at Home: none  Transportation Concerns: car, none  Readmission Within the Last 30 Days: no previous admission in last 30 days  Patient/Family Anticipated Services at Transition: none  Patient/Family Anticipates Transition to: home with family  Transportation Anticipated: family or friend will provide  Concerns to be Addressed: discharge planning   Met with patient at bedside. S/P Spinal surgery. Independent with adls prior to admission. Plans to return home with family assist. Wants to go home today. PCP Dr Ok Mitchell. Pharmacy [facility] [address] [place] [address]. No discharge planning needs identified at this time. Family will transport home.

## 2021-05-21 NOTE — PROGRESS NOTES
Patient: Sharifa Sal  Location: [facility] 5PAV 5454  MRN: [id number]  Today's date: 5/21/2021    Pt left in bedside recliner with chair alarm set, call bell in reach, VSS, and NAD.      Sharifa is a 40 y.o. female admitted on 5/20/2021 with Congenital spondylolisthesis [Q76.2]  S/P lumbar fusion [Z98.1]. Principal problem is No Principal Problem: There is no principal problem currently on the Problem List. Please update the Problem List and refresh..    Past Medical History  Sharifa has a past medical history of Anxiety, Gluten intolerance, H/O migraine, Numbness and tingling, and Spondylolisthesis.    History of Present Illness   39 y/o female from home w/ children and fiance adm 5/20/21 s/p L5-S1 PLDF by Dr Golden, RLE symptoms PTA resolved now; h/o anxiety, low BP       PT Vitals    Date/Time Pulse SpO2 O2 Therapy BP Pt Position [facility]   05/21/21 0835 76 99 % None (Room air) 93/53 Sitting NB   05/21/21 0843 79 98 % None (Room air) 102/52 Sitting NB      PT Pain    Date/Time Pain Type Location Rating: Rest Rating: Activity [facility]   05/21/21 0835 Pain Assessment back 6 6 NB          Prior Living Environment      Most Recent Value   People in Home  child(raven), dependent, significant other   Current Living Arrangements  home/apartment/condo   Home Accessibility  stairs to enter home (Group)   Number of Stairs, Main Entrance  2          Prior Level of Function      Most Recent Value   Dominant Hand  right   Ambulation  independent   Transferring  independent   Toileting  independent   Bathing  independent   Dressing  independent   Eating  independent   Communication  understands/communicates without difficulty   Swallowing  swallows foods/liquids without difficulty   Baseline Diet/Method of Nutritional Intake  no diet restrictions   Prior Level of Function Comment  lives w/ 4 children, fiance, 2SH, 2STE, no AD, tub shower, no powder room on 1st fl, , 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[facility]    Assistive Device Currently  Used at Home  none          PT Evaluation and Treatment - 05/21/21 0835        PT Time Calculation    Start Time  0835     Stop Time  0843     Time Calculation (min)  8 min        Session Details    Document Type  daily treatment/progress note     Mode of Treatment  physical therapy        General Information    Patient Profile Reviewed  yes     Onset of Illness/Injury or Date of Surgery  05/20/21     Referring Physician  Kurd     Existing Precautions/Restrictions  fall;spinal        Cognition/Psychosocial    Affect/Mental Status (Cognition)  WFL     Orientation Status (Cognition)  oriented x 4     Follows Commands (Cognition)  WFL     Cognitive Function  WFL        Bed Mobility    Comment (Bed Mobility)  Pt handed off from OT in ortho gym         Sit to Stand Transfer    [place], Sit to Stand Transfer  independent     Assistive Device  none        Stand to Sit Transfer    [place], Stand to Sit Transfer  independent     Assistive Device  none        Car Transfer    Transfer Technique  sit-stand;stand-sit     [place], Car Transfer  independent     Verbal Cues  maintaining precautions;technique     Assistive Device  none     Comment  Pt cued for technique in order to maintain spinal precautions, requires no physical assistance        Gait Training    [place], Gait  independent     Assistive Device  none     Distance in Feet  200 feet     Pattern (Gait)  step-through     Comment (Gait/Stairs)  Pt independently ambulated 200ft        Stairs Training    [place], Stairs  independent     Assistive Device  none     Handrail Location (Stairs)  none     Number of Stairs  4     Ascending Stairs Technique  step-over-step     Descending Stairs Technique  step-over-step     Comment  Pt independently ascended/descended stairs without physical assistance or VCing.        Balance    Static Sitting Balance  WFL     Dynamic Sitting Balance  WFL     Sit to Stand Dynamic Balance  WFL;standing     Static Standing  Balance  WFL;standing     Dynamic Standing Balance  WFL;standing        AM-PAC (TM) - Mobility (Current Function)    Turning from your back to your side while in a flat bed without using bedrails?  4 - None     Moving from lying on your back to sitting on the side of a flat bed without using bedrails?  4 - None     Moving to and from a bed to a chair?  4 - None     Standing up from a chair using your arms?  4 - None     To walk in a hospital room?  4 - None     Climbing 3-5 steps with a railing?  4 - None     AM-PAC (TM) Mobility Score  24        Therapy Assessment/Plan (PT)    Rehab Potential (PT)  good, to achieve stated therapy goals     Therapy Frequency (PT)  5-7 times/wk        Progress Summary (PT)    Daily Outcome Statement (PT)  Pt is safe for discharge home from PT standpoint, acute goals met.        Therapy Plan Review/Discharge Plan (PT)    PT Recommended Discharge Disposition  home;home with outpatient services     Anticipated Equipment Needs at Discharge (PT)  none    Pt is independent                      Education Documentation  Activity, taught by Gemma Park at 5/21/2021  1:04 PM.  Learner: Patient  Readiness: Acceptance  Method: Explanation  Response: Verbalizes Understanding  Comment: Pt educated on POC and spinal precautions          PT Goals      Most Recent Value   Bed Mobility Goal 1   Activity/Assistive Device  bed mobility activities, all at 05/20/2021 1455   [place]  independent at 05/20/2021 1455   Time Frame  by discharge at 05/20/2021 1455   Progress/Outcome  goal met at 05/21/2021 0835   Transfer Goal 1   Activity/Assistive Device  all transfers at 05/20/2021 1455   [place]  independent at 05/20/2021 1455   Time Frame  by discharge at 05/20/2021 1455   Progress/Outcome  goal met at 05/21/2021 0835   Gait Training Goal 1   Activity/Assistive Device  gait (walking locomotion) at 05/20/2021 1455   [place]  independent at 05/20/2021 1455   Distance  200 at 05/20/2021 1455    Time Frame  by discharge at 05/20/2021 1455   Progress/Outcome  goal met at 05/21/2021 0835   Stairs Goal 1   Activity/Assistive Device  ascending stairs, descending stairs, no assistive device at 05/20/2021 1455   [place]  independent at 05/20/2021 1455   Number of Stairs  12 at 05/20/2021 1455   Time Frame  by discharge at 05/20/2021 1455   Progress/Outcome  other (see comments) [Adequate for DC] at 05/21/2021 0835   Precaution Goal 1   Activity  spinal precautions at 05/20/2021 1455   [place]  independently at 05/20/2021 1455   Time Frame  by discharge at 05/20/2021 1455   Progress/Outcome  goal met at 05/21/2021 0835

## 2021-05-21 NOTE — PROGRESS NOTES
Per Dr. [place]ico hurst to pull drain. Drain and suture removed and dressing changed. Will discharge patient to home.

## 2021-05-21 NOTE — PLAN OF CARE
Plan of Care Review  Plan of Care Reviewed With: patient  Progress: improving  Outcome Summary: pt cleared for discharge home today.

## 2021-05-21 NOTE — NURSING NOTE
Spoke with ortho resident about asymptomatic low BP while resting in bed.  RN will continue to monitor, no new orders at this time.

## 2021-05-21 NOTE — PROGRESS NOTES
Orthopaedic Spine Surgery Postoperative Check    [S]  Pain well controlled. Patient has been ambulatory. Tolerating diet. Passing flatus.    [O]    Vitals:    05/21/21 0452   BP: (!) 96/47   Pulse: 80   Resp: 16   Temp: 36.5 °C (97.7 °F)   SpO2: 98%         Physical Exam    Lumbar dressing in place, clean, dry, intact.  Drain: HV x1    RUE  Inspection: No gross deformity. Skin in tact.  Neurovascular: Palpable radial pulse. SILT C4-T1 distribution.  Motor: Deltoid 5/5, biceps 5/5, triceps 5/5, wrist extensors 5/5, hand intrinsics 5/5    LUE  Inspection: No gross deformity. Skin in tact.  Neurovascular: Palpable radial pulse. SILT C4-T1 distribution.  Motor: Deltoid 5/5, biceps 5/5, triceps 5/5, wrist extensors 5/5, hand intrinsics 5/5    RLE:  Inspection: No gross deformity. Skin in tact.   Neurovascular: Palpable DP pulse. SILT L2-S1 distibution.  Motor: IP 5/5, Quad 5/5, TA 5/5, EHL 5/5, GS 5/5    LLE:  Inspection: No gross deformity. Skin in tact.   Neurovascular: Palpable DP pulse. SILT L2-S1 distibution.  Motor: IP 5/5, Quad 5/5, TA 5/5, EHL 5/5, GS 5/5    AP: 40 y.o. female POD 1 s/p L5-S1 TLIF    -- Pain control  -- WB: WBAT  -- DVT: SQH  -- Advance diet as tolerated  -- Monitor DONALD drain output  -- PT/OT  -- Monitor neurovascular status    George Pablo MD

## 2021-05-21 NOTE — PLAN OF CARE
Problem: Adult Inpatient Plan of Care  Goal: Plan of Care Review  Outcome: Progressing  Flowsheets (Taken 5/21/2021 0645)  Progress: improving  Plan of Care Reviewed With: patient  Outcome Summary: Pt OOB x 1 assist and supervision. Pain well controlled with ATC tylenol.  [place] DC'd this AM, DTV by 1335pm.  Hemovac drain compressed with bloody drainage, output 50mL from 11pm-6am.  Right leg pain resolved with the surgery. Pt hopes to be DC'd home today.   Plan of Care Review  Plan of Care Reviewed With: patient  Progress: improving  Outcome Summary: Pt OOB x 1 assist and supervision. Pain well controlled with ATC tylenol.  [place] DC'd this AM, DTV by 1335pm.  Hemovac drain compressed with bloody drainage, output 50mL from 11pm-6am.  Right leg pain resolved with the surgery. Pt hopes to be DC'd home today.

## 2021-05-21 NOTE — PATIENT CARE CONFERENCE
Care Progression Rounds Note  Date: 5/21/2021  Time: 10:18 AM     Patient Name: Sharifa Sal     Medical Record Number: [id number]   YOB: 1980  Sex: Female      Room/Bed: 5454    Admitting Diagnosis: Congenital spondylolisthesis [Q76.2]  S/P lumbar fusion [Z98.1]   Admit Date/Time: 5/20/2021  6:37 AM    Primary Diagnosis: No Principal Problem: There is no principal problem currently on the Problem List. Please update the Problem List and refresh.  Principal Problem: No Principal Problem: There is no principal problem currently on the Problem List. Please update the Problem List and refresh.    GMLOS: pending  Anticipated Discharge Date: 5/21/2021    AM-PAC:  Mobility Score: 20    Discharge Planning:  Current Living Arrangements: home/apartment/condo    Barriers to Discharge:  Barriers to Discharge: Therapy update pending, Test pending    Participants:  advanced practice provider, , nursing, social work/services, physical therapy

## (undated) DEVICE — Device

## (undated) DEVICE — COVER BACK TABLE REINFORCED

## (undated) DEVICE — SOLN IRRIG .9%SOD 1000ML

## (undated) DEVICE — DRAPE C-ARM X-RAY EQUIPMENT IMAGE

## (undated) DEVICE — MANIFOLD FOUR PORT NEPTUNE

## (undated) DEVICE — BLANKET UPPER BODY BAIR HUGGER

## (undated) DEVICE — MASTISOL LIQUID ADHESIVE

## (undated) DEVICE — FORCEP BAYONET BIPOLAR 8"

## (undated) DEVICE — PACK RFID LAMINECTOMY

## (undated) DEVICE — APPLICATOR CHLORAPREP 26ML ORANGE TINT

## (undated) DEVICE — BAG DECANTER

## (undated) DEVICE — DRAPE TOWEL 17X23 NON-STERILE

## (undated) DEVICE — TIP SUCTION FRAZIER 12FR

## (undated) DEVICE — ***USE 57698*** SLEEVE FLOWTRON DVT CALF SINGLE USE

## (undated) DEVICE — GOWN SURGICAL REINFORCED X-LAR

## (undated) DEVICE — ***USE 138472*** SUTURE ETHILON 2-0   1697H

## (undated) DEVICE — TUBE SUCTION 1/4INX20FT STERILE

## (undated) DEVICE — COVER LIGHTHANDLE

## (undated) DEVICE — PAD GROUND ELECTROSURGICAL W/CORD

## (undated) DEVICE — TIP BOVIE BLADE COATED INSULATED 2.75IN

## (undated) DEVICE — DRAPE HALF STERILE

## (undated) DEVICE — ***USE 138090*** COTTONOIDS 1/2 X 1

## (undated) DEVICE — HEAD REST WITH DERMAPROX INSERT

## (undated) DEVICE — COVER MAYO STAND

## (undated) DEVICE — TUBING SMOKE EVAC PENCIL COATED

## (undated) DEVICE — TIP SUCTION YANKAUER

## (undated) DEVICE — ***USE 138550*** SUTURE VICRYL 1 J702D CR MO-4 18IN VIOLET

## (undated) DEVICE — NEEDLE SPINAL 18G X3-1-2IN

## (undated) DEVICE — SUTURE VICRYL 2-0  J762D CR CP-2 18IN

## (undated) DEVICE — DRESSING MEPILEX 4X8 BORDER

## (undated) DEVICE — FORCEP BIPOLAR NONSTICK DISP 8.5IN

## (undated) DEVICE — GLOVE SZ 8 LINER PROTEXIS PI BL

## (undated) DEVICE — GLOVE SZ 7.5 PROTEXIS CLASSIC LATEX

## (undated) DEVICE — ***USE 138470*** SUTURE ETHILON 3-0   1683H

## (undated) DEVICE — BLADE BONE MILL DISP MEDIUM

## (undated) DEVICE — HEAD REST WITH DERMAPROX SQUARE

## (undated) DEVICE — SYRINGE DISP LUER-LOK  3 CC

## (undated) DEVICE — SOLN IRRIG STER WATER 1000ML

## (undated) DEVICE — KIT EVAC DRAIN 10FR 400CC 1/8IN

## (undated) DEVICE — *T* 3.0MM MATCH HEAD PRECISION NEURO BUR

## (undated) DEVICE — DRAPE STERI TOWEL PLASTIC 18X24

## (undated) DEVICE — DRESSING MEPILEX 4X4 BORDER

## (undated) DEVICE — BLADE SCALPEL #15